# Patient Record
Sex: MALE | Race: WHITE | NOT HISPANIC OR LATINO | Employment: OTHER | URBAN - METROPOLITAN AREA
[De-identification: names, ages, dates, MRNs, and addresses within clinical notes are randomized per-mention and may not be internally consistent; named-entity substitution may affect disease eponyms.]

---

## 2019-06-21 ENCOUNTER — CLINICAL SUPPORT (OUTPATIENT)
Dept: URGENT CARE | Facility: CLINIC | Age: 54
End: 2019-06-21
Payer: MEDICAID

## 2019-06-21 VITALS
RESPIRATION RATE: 16 BRPM | HEIGHT: 70 IN | TEMPERATURE: 98 F | SYSTOLIC BLOOD PRESSURE: 133 MMHG | BODY MASS INDEX: 43.09 KG/M2 | HEART RATE: 85 BPM | DIASTOLIC BLOOD PRESSURE: 94 MMHG | OXYGEN SATURATION: 97 % | WEIGHT: 301 LBS

## 2019-06-21 DIAGNOSIS — S90.862A INSECT BITE OF LEFT FOOT WITH LOCAL REACTION, INITIAL ENCOUNTER: Primary | ICD-10-CM
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
## 2019-06-21 PROCEDURE — 99204 PR OFFICE/OUTPT VISIT, NEW, LEVL IV, 45-59 MIN: ICD-10-PCS | Mod: S$GLB,,, | Performed by: NURSE PRACTITIONER

## 2019-06-21 PROCEDURE — 99204 OFFICE O/P NEW MOD 45 MIN: CPT | Mod: S$GLB,,, | Performed by: NURSE PRACTITIONER

## 2019-06-21 RX ORDER — AMLODIPINE BESYLATE 10 MG/1
10 TABLET ORAL DAILY
COMMUNITY
End: 2019-06-27 | Stop reason: SDUPTHER

## 2019-06-21 RX ORDER — CETIRIZINE HYDROCHLORIDE 10 MG/1
10 TABLET ORAL DAILY
Qty: 30 TABLET | Refills: 0 | Status: SHIPPED | OUTPATIENT
Start: 2019-06-21 | End: 2020-09-01

## 2019-06-21 RX ORDER — PREDNISONE 20 MG/1
40 TABLET ORAL DAILY
Qty: 10 TABLET | Refills: 0 | Status: SHIPPED | OUTPATIENT
Start: 2019-06-21 | End: 2019-06-26

## 2019-06-21 RX ORDER — IBUPROFEN 800 MG/1
800 TABLET ORAL EVERY 8 HOURS PRN
Qty: 30 TABLET | Refills: 0 | Status: SHIPPED | OUTPATIENT
Start: 2019-06-21 | End: 2019-07-01

## 2019-06-21 NOTE — PATIENT INSTRUCTIONS
Insect Bite  Insects most often bite to protect themselves or their nests. Certain bugs, like fleas and mosquitoes, bite to feed. In some cases, the actual bite causes no pain. An itchy red welt or swelling may develop at the site of the bite. Most insect bites do not cause illness. And the itching and swelling most often go away without treatment. However, an infection can develop if the bite is scratched and the skin broken. Rarely, a person may have an allergic reaction to an insect bite.  If a stinger is visible at the bite spot, remove it as quickly as possible, as this can decrease the amount of venom that gets into your body. Scrape it out with a dull edge, such as the edge of a credit card. Try not to squeeze it. Do not try to dig it out, as you may damage the skin and also increase the chance of infection.     To help reduce swelling and itching, apply a cold pack or ice in a zip-top plastic bag wrapped in a thin towel.   Home care  · Your healthcare provider may prescribe over-the-counter medicines to help relieve itching and swelling. Use each medicine according to the directions on the package. If the bite becomes infected, you will need an antibiotic. This may be in pill form taken by mouth or as an ointment or cream put directly on the skin. Be sure to use them exactly as prescribed.  · Bite symptoms usually go away on their own within a week or two.  · To help prevent infection, avoid scratching or picking at the bite.  · To help relieve itching and swelling, apply ice in a zip-top plastic bag wrapped in a thin towel to the bites. Do this for up to 10 minutes at a time. Avoid hot showers or baths as these tend to make itching worse.  · An over-the-counter anti-itch medicine such as calamine lotion or an antihistamine cream may be helpful.  · If you suspect you have insects in your home, talk to a licensed pest-control professional. He or she can inspect your home and tell you how to get rid of bugs  safely.  Follow-up care  Follow up with your healthcare provider, or as advised.  Call 911  Call 911 if any of these occur:  · Trouble breathing or swallowing  · Wheezing  · Feeling like your throat is closing up  · Fainting, loss of consciousness  · Swelling around the face or mouth  When to seek medical advice  Call your healthcare provider right away if any of these occur:  · Fever of 100.4°F (38°C) or higher, or as directed by your healthcare provider  · Signs of infection, such as increased swelling and pain, warmth, red streaks, or drainage from the skin  · Signs of allergic reaction, such as hives, a spreading rash, or throat itching  Date Last Reviewed: 10/1/2016  © 1209-1172 [employer]. [address] [place] [address], [place] [address]. All rights reserved. This information is not intended as a substitute for professional medical care. Always follow your healthcare professional's instructions.        Insect Sting: Local Reaction   You have been stung or bitten by an insect. The insects venom or body fluid is causing your skin to react in the area where you were stung or bitten. This often causes redness, itching and swelling. This reaction will fade over a few hours, but it can last a few days. An insect bite or sting can become infected 1 to 3 days later, so watch for the signs below. Sometimes it is hard to tell the difference between a local reaction to the insect bite or sting and an early infection, so you may be given antibiotics.  Common insect stings causing problems are from wasps, bees, yellow jackets, and hornets. Common bites are from spiders, mosquitoes, fleas, or ticks. Other types of insects may be more common in different parts of the country or world.  Most people think of allergic reactions when someone has a rash or itchy skin. Symptoms can include:  · Rash, hives, redness, welts, or blisters  · Itching, burning, stinging, or pain  · Swelling around the sting area.  Sometimes  swelling spreads to other areas.  Home care  Medicines  The healthcare provider may prescribe medicines to relieve swelling, itching, and pain. Follow the providers instructions when taking these medicines.  · If you had a severe reaction, the provider may prescribe an epinephrine kit. Epinephrine will stop an allergic reaction from getting worse. Before you leave the hospital, be sure that you understand when and how to use this medicine.  · Diphenhydramine is an oral antihistamine available at drugstores and groceries. Unless a prescription antihistamine was given, you can use this medicine to reduce itching if large areas of the skin are involved. The medicine may make you sleepy, so be careful using it in the daytime or when going to school, working, or driving. Dont use diphenhydramine if you have glaucoma or if you are a man with trouble urinating because of an enlarged prostate. Other antihistamines cause less drowsiness and are good choices for daytime use. Ask your pharmacist for suggestions.  · Dont use diphenhydramine cream on your skin. In some people it can cause additional reaction and make you allergic to this medicine.  · Calamine lotion or oatmeal baths sometimes help with itching.  · You may use acetaminophen or ibuprofen to control pain, unless another pain medicine was prescribed. Talk with your healthcare provider before using these medicines if you have chronic liver or kidney disease. Also talk with your provider if youve had a stomach ulcer or GI bleeding.  General care    · If itching is a problem, dont take hot showers or baths. Stay out of direct sunlight. These heat up your skin and will make the itching worse.  · Use an ice pack to reduce local areas of redness and itching. You can make your own ice pack by putting ice cubes in a bag that seals and wrapping it in a thin towel. Dont put the ice directly on your skin, because it can damage the skin.  · Try not to scratch any affected  areas and damage the skin. This will help prevent an infection.  · If oral antibiotics were prescribed, be sure to take them until finished.  Preventing future reactions  · Future reactions could be worse than this one, so try to stay away from places where you might be stung again.  · Be aware that honeybees nest in trees. Wasps and yellow jackets nest in the ground, trees, or roof eaves.  · If you are stung by a honeybee, a stinger will remain in your skin. Wasps, yellow jackets, and hornets dont leave a stinger behind. Move away from the nest area right away. The stinger of a honeybee releases a substance that will attract other bees to you. Once you are away from the nest, then remove the stinger as quickly as possible.  · After any sting, you may apply ice and take diphenhydramine or another antihistamine. If you develop any of the warning signs below, seek help right away.  · If you are at high risk for another sting, or if your reaction included dizziness, fainting, or trouble breathing or swallowing, ask your doctor for an insect allergy kit.  · Remove any ticks on the skin with a set of fine tweezers.  the tick as close to the skin as possible. Pull back gently but firmly. Use an even, steady pressure. Dont jerk or twist. Dont squeeze, crush, or puncture the body of the tick. The bodily fluids may contain infection-causing germs. Dont use a smoldering match or cigarette, nail polish, petroleum jelly, liquid soap, or kerosene. These may irritate the tick. If any mouthparts of the tick remain in the skin, these should be left alone. They will fall off on their own. Trying to remove these parts may damage the skin unless they can be removed very easily. After the tick is removed, wash the bite area with rubbing alcohol, iodine, or soap and water.  Follow-up care  Follow up with your doctor in 2 days, or as advised, if your symptoms dont start to get better.  Call 911  Call 911 if any of these  occur:  · Trouble breathing or swallowing, or wheezing  · New or worsening swelling in the mouth, throat, or tongue  · Hoarse voice or trouble speaking  · Confused  · Very drowsy or trouble awakening  · Fainting or loss of consciousness  · Rapid heart rate  · Low blood pressure  · Feeling of doom  · Nausea, vomiting, abdominal pain, or diarrhea  · Vomiting blood, or large amounts of blood in stool  · Seizure  When to seek medical advice  Call your healthcare provider right away if any of these occur:  · Spreading areas of itching, redness or swelling  · New or worse swelling in the face, eyelids, or  lips  · Dizziness or weakness  Also call your provider right away if you have signs of infection:  · Spreading redness  · Increased pain or swelling  · Fever of 100.4°F (38°C) or higher, or as directed by your healthcare provider  · Colored fluid draining from the sting area   Date Last Reviewed: 10/1/2016  © 7689-4432 [employer]. [address] [place] [address], [place] [address]. All rights reserved. This information is not intended as a substitute for professional medical care. Always follow your healthcare professional's instructions.

## 2019-06-21 NOTE — PROGRESS NOTES
"Subjective:       Patient ID: Quinton Clayton is a 54 y.o. male.    Vitals:  height is 5' 10" (1.778 m) and weight is 136.5 kg (301 lb) (abnormal). His oral temperature is 97.6 °F (36.4 °C). His blood pressure is 133/94 (abnormal) and his pulse is 85. His respiration is 16 and oxygen saturation is 97%.     Chief Complaint: Insect Bite (X 1 week)    Patient reports pain and swelling to left foot s/p a sand fly biting him 5 days ago.     Insect Bite   This is a new problem. The current episode started in the past 7 days. The problem occurs constantly. The problem has been gradually worsening. Pertinent negatives include no abdominal pain, arthralgias, chest pain, chills, congestion, coughing, fatigue, fever, headaches, joint swelling, myalgias, nausea, rash, sore throat, vertigo or vomiting. Nothing aggravates the symptoms. He has tried nothing for the symptoms. The treatment provided no relief.       Constitution: Negative for chills, fatigue and fever.   HENT: Negative for congestion and sore throat.    Neck: Negative for painful lymph nodes.   Cardiovascular: Negative for chest pain and leg swelling.   Eyes: Negative for double vision and blurred vision.   Respiratory: Negative for cough and shortness of breath.    Gastrointestinal: Negative for abdominal pain, nausea, vomiting and diarrhea.   Genitourinary: Negative for dysuria, frequency and urgency.   Musculoskeletal: Negative for joint pain, joint swelling, muscle cramps and muscle ache.   Skin: Positive for wound. Negative for color change, pale and rash.   Allergic/Immunologic: Negative for seasonal allergies.   Neurological: Negative for dizziness, history of vertigo, light-headedness, passing out and headaches.   Hematologic/Lymphatic: Negative for swollen lymph nodes, easy bruising/bleeding and history of blood clots. Does not bruise/bleed easily.   Psychiatric/Behavioral: Negative for nervous/anxious, sleep disturbance and depression. The patient is not " nervous/anxious.        Objective:      Physical Exam   Constitutional: He is oriented to person, place, and time. Vital signs are normal. He appears well-developed and well-nourished.  Non-toxic appearance. He does not have a sickly appearance. He does not appear ill.   HENT:   Head: Normocephalic.   Right Ear: Tympanic membrane, external ear and ear canal normal.   Left Ear: Tympanic membrane, external ear and ear canal normal.   Nose: Nose normal.   Mouth/Throat: Uvula is midline and oropharynx is clear and moist.   Eyes: Pupils are equal, round, and reactive to light. Conjunctivae and EOM are normal.   Neck: Normal range of motion and full passive range of motion without pain. Neck supple.   Cardiovascular: Normal rate, regular rhythm and normal heart sounds.   Pulmonary/Chest: Effort normal and breath sounds normal.   Abdominal: Soft. Normal appearance and bowel sounds are normal. There is no tenderness.   Musculoskeletal: Normal range of motion.   Lymphadenopathy:     He has no cervical adenopathy.   Neurological: He is alert and oriented to person, place, and time. GCS eye subscore is 4. GCS verbal subscore is 5. GCS motor subscore is 6.   Skin: Skin is warm, dry and intact. No rash noted.   Insect bite to dorsum of left foot with localized edema, tender to touch. Not warm to touch, no induration, no fluctuance.    Psychiatric: He has a normal mood and affect.   Vitals reviewed.      Assessment:       1. Insect bite of left foot with local reaction, initial encounter        Plan:         Insect bite of left foot with local reaction, initial encounter    Other orders  -     cetirizine (ZYRTEC) 10 MG tablet; Take 1 tablet (10 mg total) by mouth once daily.  Dispense: 30 tablet; Refill: 0  -     predniSONE (DELTASONE) 20 MG tablet; Take 2 tablets (40 mg total) by mouth once daily. for 5 days  Dispense: 10 tablet; Refill: 0  -     ibuprofen (ADVIL,MOTRIN) 800 MG tablet; Take 1 tablet (800 mg total) by mouth every  8 (eight) hours as needed for Pain.  Dispense: 30 tablet; Refill: 0

## 2019-06-27 RX ORDER — AMLODIPINE BESYLATE 10 MG/1
10 TABLET ORAL DAILY
Qty: 30 TABLET | Refills: 0 | Status: SHIPPED | OUTPATIENT
Start: 2019-06-27 | End: 2020-09-01 | Stop reason: SDUPTHER

## 2020-09-01 ENCOUNTER — OFFICE VISIT (OUTPATIENT)
Dept: URGENT CARE | Facility: CLINIC | Age: 55
End: 2020-09-01
Payer: MEDICAID

## 2020-09-01 VITALS
RESPIRATION RATE: 16 BRPM | HEIGHT: 72 IN | TEMPERATURE: 98 F | DIASTOLIC BLOOD PRESSURE: 92 MMHG | BODY MASS INDEX: 40.12 KG/M2 | OXYGEN SATURATION: 96 % | SYSTOLIC BLOOD PRESSURE: 154 MMHG | WEIGHT: 296.19 LBS | HEART RATE: 71 BPM

## 2020-09-01 DIAGNOSIS — I10 HYPERTENSION, UNSPECIFIED TYPE: Primary | ICD-10-CM

## 2020-09-01 DIAGNOSIS — K21.9 GASTROESOPHAGEAL REFLUX DISEASE, ESOPHAGITIS PRESENCE NOT SPECIFIED: ICD-10-CM

## 2020-09-01 PROCEDURE — 99214 OFFICE O/P EST MOD 30 MIN: CPT | Mod: S$GLB,,, | Performed by: EMERGENCY MEDICINE

## 2020-09-01 PROCEDURE — 99214 PR OFFICE/OUTPT VISIT, EST, LEVL IV, 30-39 MIN: ICD-10-PCS | Mod: S$GLB,,, | Performed by: EMERGENCY MEDICINE

## 2020-09-01 RX ORDER — PANTOPRAZOLE SODIUM 40 MG/1
40 TABLET, DELAYED RELEASE ORAL DAILY
Qty: 30 TABLET | Refills: 1 | Status: SHIPPED | OUTPATIENT
Start: 2020-09-01 | End: 2021-02-02 | Stop reason: SDUPTHER

## 2020-09-01 RX ORDER — AMLODIPINE BESYLATE 10 MG/1
10 TABLET ORAL DAILY
Qty: 30 TABLET | Refills: 1 | Status: SHIPPED | OUTPATIENT
Start: 2020-09-01 | End: 2021-02-02 | Stop reason: SDUPTHER

## 2020-09-01 NOTE — PATIENT INSTRUCTIONS
Low-Salt Choices  Eating salt (sodium) can make your body retain too much water. Excess water makes your heart work harder. Canned, packaged, and frozen foods are easy to prepare, but they are often high in sodium. Here are some ideas for low-salt foods you can easily prepare yourself.    For breakfast  · Fruit or 100% fruit juice  · Whole-wheat bread or an English muffin. Compare sodium content on labels.  · Low-fat milk or yogurt  · Unsalted eggs  · Shredded wheat  · Corn tortillas  · Unsalted steamed rice  · Regular (not instant) hot cereal, made without salt  Stay away from:  · Sausage, perez, and ham  · Flour tortillas  · Packaged muffins, pancakes, and biscuits  · Instant hot cereals  · Cottage cheese  For lunch and dinner  · Fresh fish, chicken, turkey, or meat--baked, broiled, or roasted without salt  · Dry beans, cooked without salt  · Tofu, stir-fried without salt  · Unsalted fresh fruit and vegetables, or frozen or canned fruit and vegetables with no added salt  Stay away from:  · Lunch or deli meat that is cured or smoked  · Cheese  · Tomato juice and catsup  · Canned vegetables, soups, and fish not labeled as no-salt-added or reduced sodium  · Packaged gravies and sauces  · Olives, pickles, and relish  · Bottled salad dressings  For snacks and desserts  · Yogurt  · Unsalted, air popped popcorn  · Unsalted nuts or seeds  Stay away from:  · Pies and cakes  · Packaged dessert mixes  · Pizza  · Canned and packaged puddings  · Pretzels, chips, crackers, and nuts--unless the label says unsalted  Date Last Reviewed: 6/17/2015  © 5828-7144 [employer]. [address] [place] [address], [place] [address]. All rights reserved. This information is not intended as a substitute for professional medical care. Always follow your healthcare professional's instructions.        Low-Salt Diet  This diet removes foods that are high in salt. It also limits the amount of salt you use when cooking. It is most often used  for people with high blood pressure, edema (fluid retention), and kidney, liver, or heart disease.  Table salt contains the mineral sodium. Your body needs sodium to work normally. But too much sodium can make your health problems worse. Your healthcare provider is recommending a low-salt (also called low-sodium) diet for you. Your total daily allowance of salt is 1,500 to 2,300 milligrams (mg). It is less than 1 teaspoon of table salt. This means you can have only about 500 to 700 mg of sodium at each meal. People with certain health problems should limit salt intake to the lower end of the recommended range.    When you cook, dont add much salt. If you can cook without using salt, even better. Dont add salt to your food at the table.  When shopping, read food labels. Salt is often called sodium on the label. Choose foods that are salt-free, low salt, or very low salt. Note that foods with reduced salt may not lower your salt intake enough.    Beans, potatoes, and pasta  Ok: Dry beans, split peas, lentils, potatoes, rice, macaroni, pasta, spaghetti without added salt  Avoid: Potato chips, tortilla chips, and similar products  Breads and cereals  Ok: Low-sodium breads, rolls, cereals, and cakes; low-salt crackers, matzo crackers  Avoid: Salted crackers, pretzels, popcorn, Setswana toast, pancakes, muffins  Dairy  Ok: Milk, chocolate milk, hot chocolate mix, low-salt cheeses, and yogurt  Avoid: Processed cheese and cheese spreads; Roquefort, Camembert, and cottage cheese; buttermilk, instant breakfast drink  Desserts  Ok: Ice cream, frozen yogurt, juice bars, gelatin, cookies and pies, sugar, honey, jelly, hard candy  Avoid: Most pies, cakes and cookies prepared or processed with salt; instant pudding  Drinks  Ok: Tea, coffee, fizzy (carbonated) drinks, juices  Avoid: Flavored coffees, electrolyte replacement drinks, sports drinks  Meats  Ok: All fresh meat, fish, poultry, low-salt tuna, eggs, egg substitute  Avoid:  Smoked, pickled, brine-cured, or salted meats and fish. This includes perez, chipped beef, corned beef, hot dogs, deli meats, ham, kosher meats, salt pork, sausage, canned tuna, salted codfish, smoked salmon, herring, sardines, or anchovies.  Seasonings and spices  Ok: Most seasonings are okay. Good substitutes for salt include: fresh herb blends, hot sauce, lemon, garlic, pena, vinegar, dry mustard, parsley, cilantro, horseradish, tomato paste, regular margarine, mayonnaise, unsalted butter, cream cheese, vegetable oil, cream, low-salt salad dressing and gravy.  Avoid: Regular ketchup, relishes, pickles, soy sauce, teriyaki sauce, Worcestershire sauce, BBQ sauce, tartar sauce, meat tenderizer, chili sauce, regular gravy, regular salad dressing, salted butter  Soups  Ok: Low-salt soups and broths made with allowed foods  Avoid: Bouillon cubes, soups with smoked or salted meats, regular soup and broth  Vegetables  Ok: Most vegetables are okay; also low-salt tomato and vegetable juices  Avoid: Sauerkraut and other brine-soaked vegetables; pickles and other pickled vegetables; tomato juice, olives  Date Last Reviewed: 8/1/2016  © 4736-4801 [employer]. [address] [place] [address], [place] [address]. All rights reserved. This information is not intended as a substitute for professional medical care. Always follow your healthcare professional's instructions.

## 2020-09-01 NOTE — PROGRESS NOTES
Subjective:       Patient ID: Quinton Clayton is a 55 y.o. male.    Vitals:  height is 6' (1.829 m) and weight is 134.4 kg (296 lb 3.2 oz). His temperature is 97.8 °F (36.6 °C). His blood pressure is 154/92 (abnormal) and his pulse is 71. His respiration is 16 and oxygen saturation is 96%.     Chief Complaint: Hypertension    Patient presents today with concerns of elevated BP and medication refill request. Patient states that he has been staying in [place] taking care of a friend during the pandemic. He has not been bale to see his PCP who is in [facility]. He reports that he usually takes amlodipine 10 mg once daily but has been out of his meds for about a week. He denies cp/sob, Denies HA, dizziness and lightheadedness. No ankle edema. He reports that while on medication his BP range 120-130/ 85-90.         Constitution: Negative for activity change, appetite change, chills, fatigue and unexpected weight change.   HENT: Negative.    Cardiovascular: Negative for chest pain, leg swelling, palpitations and sob on exertion.   Eyes: Negative for photophobia, vision loss, double vision and blurred vision.   Respiratory: Negative for chest tightness, cough and shortness of breath.    Gastrointestinal: Positive for heartburn (not current issue but does happen occasioanlly). Negative for abdominal pain.   Neurological: Negative for dizziness, light-headedness and headaches.       Objective:      Physical Exam   Constitutional: He is oriented to person, place, and time. He appears well-developed. He is cooperative.  Non-toxic appearance. He does not appear ill. No distress.   HENT:   Head: Normocephalic and atraumatic.   Ears:   Right Ear: Hearing, tympanic membrane, external ear and ear canal normal.   Left Ear: Hearing, tympanic membrane, external ear and ear canal normal.   Nose: Nose normal. No mucosal edema, rhinorrhea or nasal deformity. No epistaxis. Right sinus exhibits no maxillary sinus tenderness and no frontal  sinus tenderness. Left sinus exhibits no maxillary sinus tenderness and no frontal sinus tenderness.   Mouth/Throat: Uvula is midline, oropharynx is clear and moist and mucous membranes are normal. No trismus in the jaw. Normal dentition. No uvula swelling. No oropharyngeal exudate, posterior oropharyngeal edema or posterior oropharyngeal erythema.   Eyes: Conjunctivae and lids are normal. No scleral icterus.   Neck: Trachea normal, full passive range of motion without pain and phonation normal. Neck supple. No neck rigidity. No edema and no erythema present.   Cardiovascular: Normal rate, regular rhythm and normal heart sounds.   Pulmonary/Chest: Effort normal and breath sounds normal. No respiratory distress. He has no decreased breath sounds. He has no rhonchi.   Abdominal: Normal appearance.   Musculoskeletal: Normal range of motion.         General: No deformity.   Neurological: He is alert and oriented to person, place, and time. He exhibits normal muscle tone. Coordination normal.   Skin: Skin is warm, dry, intact, not diaphoretic and not pale. Psychiatric: His speech is normal and behavior is normal. Mood, judgment and thought content normal.   Nursing note and vitals reviewed.        Assessment:       1. Hypertension, unspecified type    2. Gastroesophageal reflux disease, esophagitis presence not specified        Plan:       Discussed lifestyle changes and importance of primary care for appropriate follow up.  Low salt diet  Increase water  Graded exercise    Hypertension, unspecified type  -     amLODIPine (NORVASC) 10 MG tablet; Take 1 tablet (10 mg total) by mouth once daily.  Dispense: 30 tablet; Refill: 1    Gastroesophageal reflux disease, esophagitis presence not specified  -     pantoprazole (PROTONIX) 40 MG tablet; Take 1 tablet (40 mg total) by mouth once daily.  Dispense: 30 tablet; Refill: 1

## 2020-09-03 ENCOUNTER — TELEPHONE (OUTPATIENT)
Dept: URGENT CARE | Facility: CLINIC | Age: 55
End: 2020-09-03

## 2020-10-26 ENCOUNTER — OFFICE VISIT (OUTPATIENT)
Dept: URGENT CARE | Facility: CLINIC | Age: 55
End: 2020-10-26
Payer: MEDICAID

## 2020-10-26 VITALS
WEIGHT: 303 LBS | DIASTOLIC BLOOD PRESSURE: 80 MMHG | BODY MASS INDEX: 41.04 KG/M2 | RESPIRATION RATE: 16 BRPM | OXYGEN SATURATION: 95 % | HEART RATE: 80 BPM | TEMPERATURE: 98 F | SYSTOLIC BLOOD PRESSURE: 147 MMHG | HEIGHT: 72 IN

## 2020-10-26 DIAGNOSIS — L72.9 CYST OF SKIN: Primary | ICD-10-CM

## 2020-10-26 PROCEDURE — 99214 PR OFFICE/OUTPT VISIT, EST, LEVL IV, 30-39 MIN: ICD-10-PCS | Mod: S$GLB,,, | Performed by: NURSE PRACTITIONER

## 2020-10-26 PROCEDURE — 99214 OFFICE O/P EST MOD 30 MIN: CPT | Mod: S$GLB,,, | Performed by: NURSE PRACTITIONER

## 2020-10-26 NOTE — PROGRESS NOTES
Subjective:       Patient ID: Quinton Clayton is a 55 y.o. male.    Vitals:  height is 6' (1.829 m) and weight is 137.4 kg (303 lb) (abnormal). His temperature is 98 °F (36.7 °C). His blood pressure is 147/80 (abnormal) and his pulse is 80. His respiration is 16 and oxygen saturation is 95%.     Chief Complaint: Recurrent Skin Infections    9-10 days. States theres an infection behind his neck. States it is not painful. Says he opened it up about a week ago, but only blood came out. Put neopsorin on it.       Constitution: Negative for chills, fatigue and fever.   HENT: Negative for congestion and sore throat.    Neck: Negative for painful lymph nodes.   Cardiovascular: Negative for chest pain and leg swelling.   Eyes: Negative for double vision and blurred vision.   Respiratory: Negative for cough and shortness of breath.    Gastrointestinal: Negative for nausea, vomiting and diarrhea.   Genitourinary: Negative for dysuria, frequency and urgency.   Musculoskeletal: Negative for joint pain, joint swelling, muscle cramps and muscle ache.   Skin: Positive for abscess. Negative for color change, pale and rash.   Allergic/Immunologic: Negative for seasonal allergies.   Neurological: Negative for dizziness, history of vertigo, light-headedness, passing out and headaches.   Hematologic/Lymphatic: Negative for swollen lymph nodes, easy bruising/bleeding and history of blood clots. Does not bruise/bleed easily.   Psychiatric/Behavioral: Negative for nervous/anxious, sleep disturbance and depression. The patient is not nervous/anxious.        Objective:      Physical Exam   Constitutional: He is oriented to person, place, and time. He appears well-developed. He is cooperative.  Non-toxic appearance. He does not appear ill. No distress.   HENT:   Head: Normocephalic and atraumatic.   Ears:   Right Ear: Hearing, tympanic membrane, external ear and ear canal normal.   Left Ear: Hearing, tympanic membrane, external ear and ear  canal normal.   Nose: Nose normal. No mucosal edema, rhinorrhea or nasal deformity. No epistaxis. Right sinus exhibits no maxillary sinus tenderness and no frontal sinus tenderness. Left sinus exhibits no maxillary sinus tenderness and no frontal sinus tenderness.   Mouth/Throat: Uvula is midline, oropharynx is clear and moist and mucous membranes are normal. No trismus in the jaw. Normal dentition. No uvula swelling. No posterior oropharyngeal erythema.   Eyes: Conjunctivae and lids are normal. Right eye exhibits no discharge. Left eye exhibits no discharge. No scleral icterus.   Neck: Trachea normal, normal range of motion, full passive range of motion without pain and phonation normal. Neck supple.   Cardiovascular: Normal rate, regular rhythm, normal heart sounds and normal pulses.   Pulmonary/Chest: Effort normal and breath sounds normal. No respiratory distress.   Abdominal: Soft. Normal appearance and bowel sounds are normal. He exhibits no distension, no pulsatile midline mass and no mass. There is no abdominal tenderness.   Musculoskeletal: Normal range of motion.         General: No deformity.   Neurological: He is alert and oriented to person, place, and time. He exhibits normal muscle tone. Coordination normal. GCS eye subscore is 4. GCS verbal subscore is 5. GCS motor subscore is 6.   Skin: Skin is warm, dry, intact, not diaphoretic and not pale.      Psychiatric: His speech is normal and behavior is normal. Judgment and thought content normal.   Nursing note and vitals reviewed.        Assessment:       1. Cyst of skin        Plan:       Referred to derm for removal of cyst.     Cyst of skin  -     Ambulatory referral/consult to Dermatology

## 2020-10-26 NOTE — PATIENT INSTRUCTIONS
Epidermoid Cyst (Sebaceous Cyst), No Infection  An epidermoid cyst (sebaceous cyst) is a term that refers to 2 similar types of cysts: those found in the skin (epidermoid), and those found around hair follicles (pilar).  Some general facts about these cysts:  · A cyst is a sac filled with material that is often cheesy, fatty, oily, or fibrous. The material in them can be thick (like cottage cheese) or liquid.  · They form slowly under the skin, and can be found on most parts of the body. They are most often found in hairier areas like the scalp, face, upper back, and genitals.  · You can usually move them slightly if you try.  · They can be smaller than a pea or as large as a few inches.  · They are usually not painful, unless they become inflamed or infected.  · The area around the cyst may smell bad. If the cyst breaks open, the material inside it often smells bad too.  Causes  Epidermoid cysts are caused when skin (epidermal) cells move under the skin surface, or are covered over by it. These cells continue to multiply, like skin does normally. They then form a wall around themselves (cyst) and secrete normal skin fluids (keratin). This may be developmental. But it often happens because of an injury to the skin.  Epidermoid cysts are often found around hair follicles. These follicles are like cysts, but they have openings. Normal lubricating oils for your hair are sent out through these openings. A cyst occurs when an opening becomes blocked or the site inflamed. This often occurs when there is damage to the hair follicles by a scrape or wound.    Pilar cysts are similar to epidermoid cysts. But they start from a different part of the hair follicle, and are more likely to be on the scalp.  Symptoms  · Feeling a lump just beneath the skin  · It may or may not be painful  · The cyst may or may not smell bad  · The cyst may become inflamed or red  · The cyst may leak fluid or thick material  Home care  Epidermoid  cysts often go away without any treatment. If your cyst doesnt go away, and it bothers you, it may be drained or removed. If the cyst drains on its own, it may return. Resist the temptation to squeeze, pop, stick a needle in it, or cut it open. This often leads to an infection and scarring. If it gets severely inflamed or infected, you should seek medical care. Be sure to clean the cyst area when bathing or showering. Watch for the signs of infection listed below.  Follow-up care  Follow up with your healthcare provider, or as advised.  When to seek medical advice  Call your healthcare provider right away if any of these occur:  · Swelling, redness, or pain  · Pus coming from the cyst  Date Last Reviewed: 8/1/2016  © 8037-6757 [employer]. [address] [place] [address], [place] [address]. All rights reserved. This information is not intended as a substitute for professional medical care. Always follow your healthcare professional's instructions.

## 2020-11-13 ENCOUNTER — TELEPHONE (OUTPATIENT)
Dept: DERMATOLOGY | Facility: CLINIC | Age: 55
End: 2020-11-13

## 2020-11-13 NOTE — TELEPHONE ENCOUNTER
Spoke with pt. Pt asked if we accept medicaid? Informed pt we're not accepting any new medicaid pt's at this time. Gave pt alternative derm information. Pt verbalized understanding and thanked me.    RD        ----- Message from Kim Ortiz sent at 11/13/2020 11:19 AM CST -----  Regarding: Appointment  Type:   Appointment Request      Name of Caller: Patient referred by Dr Ann Wolfe to Dermatology for  Cyst of skin  When is the first available appointment?  Symptoms:  Best Call Back Number:[phone]  Additional Information:

## 2021-02-02 ENCOUNTER — OFFICE VISIT (OUTPATIENT)
Dept: URGENT CARE | Facility: CLINIC | Age: 56
End: 2021-02-02
Payer: MEDICAID

## 2021-02-02 VITALS
DIASTOLIC BLOOD PRESSURE: 93 MMHG | SYSTOLIC BLOOD PRESSURE: 159 MMHG | HEIGHT: 72 IN | WEIGHT: 301 LBS | RESPIRATION RATE: 18 BRPM | OXYGEN SATURATION: 96 % | HEART RATE: 73 BPM | TEMPERATURE: 98 F | BODY MASS INDEX: 40.77 KG/M2

## 2021-02-02 DIAGNOSIS — K21.9 GASTROESOPHAGEAL REFLUX DISEASE WITHOUT ESOPHAGITIS: ICD-10-CM

## 2021-02-02 DIAGNOSIS — Z76.0 MEDICATION REFILL: ICD-10-CM

## 2021-02-02 DIAGNOSIS — I10 HYPERTENSION, UNSPECIFIED TYPE: ICD-10-CM

## 2021-02-02 DIAGNOSIS — J01.90 ACUTE NON-RECURRENT SINUSITIS, UNSPECIFIED LOCATION: Primary | ICD-10-CM

## 2021-02-02 PROCEDURE — 99214 PR OFFICE/OUTPT VISIT, EST, LEVL IV, 30-39 MIN: ICD-10-PCS | Mod: S$GLB,,, | Performed by: NURSE PRACTITIONER

## 2021-02-02 PROCEDURE — 99214 OFFICE O/P EST MOD 30 MIN: CPT | Mod: S$GLB,,, | Performed by: NURSE PRACTITIONER

## 2021-02-02 RX ORDER — CETIRIZINE HYDROCHLORIDE 10 MG/1
10 TABLET ORAL DAILY
Qty: 30 TABLET | Refills: 2 | Status: SHIPPED | OUTPATIENT
Start: 2021-02-02 | End: 2023-09-15

## 2021-02-02 RX ORDER — AMLODIPINE BESYLATE 10 MG/1
10 TABLET ORAL DAILY
Qty: 30 TABLET | Refills: 1 | Status: SHIPPED | OUTPATIENT
Start: 2021-02-02 | End: 2023-01-18 | Stop reason: SDUPTHER

## 2021-02-02 RX ORDER — PANTOPRAZOLE SODIUM 40 MG/1
40 TABLET, DELAYED RELEASE ORAL DAILY
Qty: 30 TABLET | Refills: 1 | Status: SHIPPED | OUTPATIENT
Start: 2021-02-02 | End: 2022-10-19

## 2021-02-02 RX ORDER — FLUTICASONE PROPIONATE 50 MCG
1 SPRAY, SUSPENSION (ML) NASAL 2 TIMES DAILY
Qty: 15.8 ML | Refills: 0 | Status: SHIPPED | OUTPATIENT
Start: 2021-02-02 | End: 2022-03-30 | Stop reason: SDUPTHER

## 2021-02-02 RX ORDER — DEXAMETHASONE SODIUM PHOSPHATE 4 MG/ML
8 INJECTION, SOLUTION INTRA-ARTICULAR; INTRALESIONAL; INTRAMUSCULAR; INTRAVENOUS; SOFT TISSUE
Status: DISCONTINUED | OUTPATIENT
Start: 2021-02-02 | End: 2023-07-08

## 2021-05-04 ENCOUNTER — OFFICE VISIT (OUTPATIENT)
Dept: URGENT CARE | Facility: CLINIC | Age: 56
End: 2021-05-04
Payer: MEDICAID

## 2021-05-04 VITALS
OXYGEN SATURATION: 98 % | SYSTOLIC BLOOD PRESSURE: 148 MMHG | RESPIRATION RATE: 16 BRPM | HEART RATE: 76 BPM | BODY MASS INDEX: 39.01 KG/M2 | HEIGHT: 72 IN | WEIGHT: 288 LBS | TEMPERATURE: 99 F | DIASTOLIC BLOOD PRESSURE: 85 MMHG

## 2021-05-04 DIAGNOSIS — J32.9 SINUSITIS, UNSPECIFIED CHRONICITY, UNSPECIFIED LOCATION: Primary | ICD-10-CM

## 2021-05-04 DIAGNOSIS — I10 HYPERTENSION, UNSPECIFIED TYPE: ICD-10-CM

## 2021-05-04 LAB
CTP QC/QA: YES
SARS-COV-2 RDRP RESP QL NAA+PROBE: NEGATIVE

## 2021-05-04 PROCEDURE — 99214 OFFICE O/P EST MOD 30 MIN: CPT | Mod: S$GLB,,, | Performed by: NURSE PRACTITIONER

## 2021-05-04 PROCEDURE — 99214 PR OFFICE/OUTPT VISIT, EST, LEVL IV, 30-39 MIN: ICD-10-PCS | Mod: S$GLB,,, | Performed by: NURSE PRACTITIONER

## 2021-05-04 PROCEDURE — 87635 SARS-COV-2 COVID-19 AMP PRB: CPT | Mod: QW,S$GLB,, | Performed by: NURSE PRACTITIONER

## 2021-05-04 PROCEDURE — 87635 PR SARS-COV-2 COVID-19 AMPLIFIED PROBE: ICD-10-PCS | Mod: QW,S$GLB,, | Performed by: NURSE PRACTITIONER

## 2021-05-04 RX ORDER — PROMETHAZINE HYDROCHLORIDE AND DEXTROMETHORPHAN HYDROBROMIDE 6.25; 15 MG/5ML; MG/5ML
5 SYRUP ORAL
Qty: 120 ML | Refills: 0 | Status: SHIPPED | OUTPATIENT
Start: 2021-05-04 | End: 2021-05-14

## 2021-05-04 RX ORDER — PANTOPRAZOLE SODIUM 40 MG/1
40 TABLET, DELAYED RELEASE ORAL DAILY
Qty: 14 TABLET | Refills: 0 | Status: SHIPPED | OUTPATIENT
Start: 2021-05-04 | End: 2022-05-04

## 2021-05-04 RX ORDER — BENZONATATE 200 MG/1
200 CAPSULE ORAL 3 TIMES DAILY PRN
Qty: 20 CAPSULE | Refills: 0 | Status: SHIPPED | OUTPATIENT
Start: 2021-05-04 | End: 2022-10-19 | Stop reason: SDUPTHER

## 2021-05-04 RX ORDER — MUPIROCIN 20 MG/G
OINTMENT TOPICAL DAILY
Qty: 22 G | Refills: 1 | Status: SHIPPED | OUTPATIENT
Start: 2021-05-04 | End: 2021-05-11

## 2021-05-04 RX ORDER — AMLODIPINE BESYLATE 10 MG/1
10 TABLET ORAL DAILY
Qty: 14 TABLET | Refills: 0 | Status: SHIPPED | OUTPATIENT
Start: 2021-05-04 | End: 2022-05-04

## 2021-05-04 RX ORDER — METHYLPREDNISOLONE 4 MG/1
TABLET ORAL
Qty: 1 PACKAGE | Refills: 0 | Status: SHIPPED | OUTPATIENT
Start: 2021-05-04 | End: 2021-05-25

## 2021-07-15 ENCOUNTER — OFFICE VISIT (OUTPATIENT)
Dept: URGENT CARE | Facility: CLINIC | Age: 56
End: 2021-07-15
Payer: MEDICAID

## 2021-07-15 VITALS
HEART RATE: 71 BPM | HEIGHT: 72 IN | DIASTOLIC BLOOD PRESSURE: 100 MMHG | OXYGEN SATURATION: 96 % | SYSTOLIC BLOOD PRESSURE: 156 MMHG | WEIGHT: 288.19 LBS | TEMPERATURE: 98 F | BODY MASS INDEX: 39.03 KG/M2

## 2021-07-15 DIAGNOSIS — M54.6 ACUTE LEFT-SIDED THORACIC BACK PAIN: Primary | ICD-10-CM

## 2021-07-15 DIAGNOSIS — N52.9 ERECTILE DYSFUNCTION, UNSPECIFIED ERECTILE DYSFUNCTION TYPE: ICD-10-CM

## 2021-07-15 DIAGNOSIS — I10 ESSENTIAL HYPERTENSION: ICD-10-CM

## 2021-07-15 DIAGNOSIS — Z76.0 MEDICATION REFILL: ICD-10-CM

## 2021-07-15 LAB
BILIRUB UR QL STRIP: NEGATIVE
GLUCOSE UR QL STRIP: NEGATIVE
KETONES UR QL STRIP: NEGATIVE
LEUKOCYTE ESTERASE UR QL STRIP: NEGATIVE
PH, POC UA: 5.5
POC BLOOD, URINE: NEGATIVE
POC NITRATES, URINE: NEGATIVE
PROT UR QL STRIP: NEGATIVE
SP GR UR STRIP: 1.01 (ref 1–1.03)
UROBILINOGEN UR STRIP-ACNC: NORMAL (ref 0.3–2.2)

## 2021-07-15 PROCEDURE — 81003 URINALYSIS AUTO W/O SCOPE: CPT | Mod: QW,S$GLB,, | Performed by: NURSE PRACTITIONER

## 2021-07-15 PROCEDURE — 81003 POCT URINALYSIS, DIPSTICK, AUTOMATED, W/O SCOPE: ICD-10-PCS | Mod: QW,S$GLB,, | Performed by: NURSE PRACTITIONER

## 2021-07-15 PROCEDURE — 99214 OFFICE O/P EST MOD 30 MIN: CPT | Mod: S$GLB,,, | Performed by: NURSE PRACTITIONER

## 2021-07-15 PROCEDURE — 99214 PR OFFICE/OUTPT VISIT, EST, LEVL IV, 30-39 MIN: ICD-10-PCS | Mod: S$GLB,,, | Performed by: NURSE PRACTITIONER

## 2021-07-15 RX ORDER — DICLOFENAC SODIUM 50 MG/1
50 TABLET, DELAYED RELEASE ORAL 3 TIMES DAILY PRN
Qty: 30 TABLET | Refills: 0 | Status: SHIPPED | OUTPATIENT
Start: 2021-07-15 | End: 2022-10-19

## 2021-07-15 RX ORDER — AMLODIPINE BESYLATE 10 MG/1
10 TABLET ORAL DAILY
Qty: 30 TABLET | Refills: 2 | Status: SHIPPED | OUTPATIENT
Start: 2021-07-15 | End: 2022-07-15

## 2021-07-15 RX ORDER — TIZANIDINE 4 MG/1
4 TABLET ORAL EVERY 6 HOURS PRN
Qty: 30 TABLET | Refills: 0 | Status: SHIPPED | OUTPATIENT
Start: 2021-07-15 | End: 2021-07-25

## 2021-07-15 RX ORDER — SILDENAFIL 25 MG/1
25 TABLET, FILM COATED ORAL DAILY PRN
Qty: 30 TABLET | Refills: 0 | Status: SHIPPED | OUTPATIENT
Start: 2021-07-15 | End: 2022-07-15

## 2021-07-15 RX ORDER — PANTOPRAZOLE SODIUM 40 MG/1
40 TABLET, DELAYED RELEASE ORAL DAILY
Qty: 30 TABLET | Refills: 2 | Status: SHIPPED | OUTPATIENT
Start: 2021-07-15 | End: 2022-07-15

## 2021-08-23 ENCOUNTER — OFFICE VISIT (OUTPATIENT)
Dept: URGENT CARE | Facility: CLINIC | Age: 56
End: 2021-08-23
Payer: MEDICAID

## 2021-08-23 VITALS
BODY MASS INDEX: 38.36 KG/M2 | DIASTOLIC BLOOD PRESSURE: 91 MMHG | WEIGHT: 283.19 LBS | HEART RATE: 75 BPM | HEIGHT: 72 IN | RESPIRATION RATE: 12 BRPM | OXYGEN SATURATION: 97 % | TEMPERATURE: 98 F | SYSTOLIC BLOOD PRESSURE: 144 MMHG

## 2021-08-23 DIAGNOSIS — R05.9 COUGH: ICD-10-CM

## 2021-08-23 DIAGNOSIS — U07.1 COVID-19 VIRUS DETECTED: Primary | ICD-10-CM

## 2021-08-23 LAB
CTP QC/QA: YES
SARS-COV-2 RDRP RESP QL NAA+PROBE: POSITIVE

## 2021-08-23 PROCEDURE — 87635 SARS-COV-2 COVID-19 AMP PRB: CPT | Mod: QW,S$GLB,, | Performed by: NURSE PRACTITIONER

## 2021-08-23 PROCEDURE — 99213 PR OFFICE/OUTPT VISIT, EST, LEVL III, 20-29 MIN: ICD-10-PCS | Mod: S$GLB,,, | Performed by: NURSE PRACTITIONER

## 2021-08-23 PROCEDURE — 87635 PR SARS-COV-2 COVID-19 AMPLIFIED PROBE: ICD-10-PCS | Mod: QW,S$GLB,, | Performed by: NURSE PRACTITIONER

## 2021-08-23 PROCEDURE — 99213 OFFICE O/P EST LOW 20 MIN: CPT | Mod: S$GLB,,, | Performed by: NURSE PRACTITIONER

## 2021-08-23 RX ORDER — FLUTICASONE PROPIONATE 50 MCG
1 SPRAY, SUSPENSION (ML) NASAL DAILY
Qty: 11.1 ML | Refills: 0 | Status: SHIPPED | OUTPATIENT
Start: 2021-08-23 | End: 2022-03-30 | Stop reason: SDUPTHER

## 2021-08-23 RX ORDER — PROMETHAZINE HYDROCHLORIDE AND DEXTROMETHORPHAN HYDROBROMIDE 6.25; 15 MG/5ML; MG/5ML
5 SYRUP ORAL NIGHTLY PRN
Qty: 118 ML | Refills: 0 | Status: SHIPPED | OUTPATIENT
Start: 2021-08-23 | End: 2021-09-07 | Stop reason: SDUPTHER

## 2021-08-23 RX ORDER — CETIRIZINE HYDROCHLORIDE 10 MG/1
10 TABLET ORAL DAILY
Qty: 30 TABLET | Refills: 0 | Status: SHIPPED | OUTPATIENT
Start: 2021-08-23 | End: 2021-09-22

## 2021-08-25 ENCOUNTER — INFUSION (OUTPATIENT)
Dept: INFECTIOUS DISEASES | Facility: HOSPITAL | Age: 56
End: 2021-08-25
Attending: NURSE PRACTITIONER
Payer: MEDICAID

## 2021-08-25 VITALS
OXYGEN SATURATION: 97 % | RESPIRATION RATE: 18 BRPM | TEMPERATURE: 97 F | SYSTOLIC BLOOD PRESSURE: 152 MMHG | DIASTOLIC BLOOD PRESSURE: 81 MMHG | HEART RATE: 67 BPM

## 2021-08-25 DIAGNOSIS — U07.1 COVID-19: Primary | ICD-10-CM

## 2021-08-25 PROCEDURE — M0243 CASIRIVI AND IMDEVI INFUSION: HCPCS | Performed by: NURSE PRACTITIONER

## 2021-08-25 PROCEDURE — 25000003 PHARM REV CODE 250: Performed by: NURSE PRACTITIONER

## 2021-08-25 PROCEDURE — 63600175 PHARM REV CODE 636 W HCPCS: Performed by: NURSE PRACTITIONER

## 2021-08-25 RX ORDER — ALBUTEROL SULFATE 90 UG/1
2 AEROSOL, METERED RESPIRATORY (INHALATION)
Status: DISPENSED | OUTPATIENT
Start: 2021-08-25

## 2021-08-25 RX ORDER — ACETAMINOPHEN 325 MG/1
650 TABLET ORAL ONCE AS NEEDED
Status: DISPENSED | OUTPATIENT
Start: 2021-08-25 | End: 2033-01-21

## 2021-08-25 RX ORDER — ONDANSETRON 4 MG/1
4 TABLET, ORALLY DISINTEGRATING ORAL ONCE AS NEEDED
Status: DISCONTINUED | OUTPATIENT
Start: 2021-08-25 | End: 2022-10-19

## 2021-08-25 RX ORDER — EPINEPHRINE 0.3 MG/.3ML
0.3 INJECTION SUBCUTANEOUS
Status: DISPENSED | OUTPATIENT
Start: 2021-08-25

## 2021-08-25 RX ORDER — SODIUM CHLORIDE 0.9 % (FLUSH) 0.9 %
10 SYRINGE (ML) INJECTION
Status: ACTIVE | OUTPATIENT
Start: 2021-08-25

## 2021-08-25 RX ORDER — DIPHENHYDRAMINE HYDROCHLORIDE 50 MG/ML
25 INJECTION INTRAMUSCULAR; INTRAVENOUS ONCE AS NEEDED
Status: DISCONTINUED | OUTPATIENT
Start: 2021-08-25 | End: 2022-10-19

## 2021-08-25 RX ADMIN — CASIRIVIMAB AND IMDEVIMAB 600 MG: 600; 600 INJECTION, SOLUTION, CONCENTRATE INTRAVENOUS at 12:08

## 2021-08-25 RX ADMIN — SODIUM CHLORIDE: 0.9 INJECTION, SOLUTION INTRAVENOUS at 12:08

## 2021-09-07 ENCOUNTER — OFFICE VISIT (OUTPATIENT)
Dept: URGENT CARE | Facility: CLINIC | Age: 56
End: 2021-09-07
Payer: MEDICAID

## 2021-09-07 VITALS
HEART RATE: 77 BPM | BODY MASS INDEX: 38.06 KG/M2 | DIASTOLIC BLOOD PRESSURE: 96 MMHG | OXYGEN SATURATION: 95 % | HEIGHT: 72 IN | SYSTOLIC BLOOD PRESSURE: 160 MMHG | TEMPERATURE: 99 F | WEIGHT: 281 LBS

## 2021-09-07 DIAGNOSIS — R09.82 POSTNASAL DRIP: ICD-10-CM

## 2021-09-07 DIAGNOSIS — J01.90 ACUTE BACTERIAL RHINOSINUSITIS: Primary | ICD-10-CM

## 2021-09-07 DIAGNOSIS — R05.9 COUGH: ICD-10-CM

## 2021-09-07 DIAGNOSIS — B96.89 ACUTE BACTERIAL RHINOSINUSITIS: Primary | ICD-10-CM

## 2021-09-07 PROCEDURE — 99213 OFFICE O/P EST LOW 20 MIN: CPT | Mod: S$GLB,,, | Performed by: NURSE PRACTITIONER

## 2021-09-07 PROCEDURE — 99213 PR OFFICE/OUTPT VISIT, EST, LEVL III, 20-29 MIN: ICD-10-PCS | Mod: S$GLB,,, | Performed by: NURSE PRACTITIONER

## 2021-09-07 RX ORDER — FLUTICASONE PROPIONATE 50 MCG
1 SPRAY, SUSPENSION (ML) NASAL DAILY
Qty: 15.8 ML | Refills: 0 | Status: SHIPPED | OUTPATIENT
Start: 2021-09-07 | End: 2022-03-30 | Stop reason: SDUPTHER

## 2021-09-07 RX ORDER — DOXYCYCLINE 100 MG/1
100 CAPSULE ORAL EVERY 12 HOURS
Qty: 14 CAPSULE | Refills: 0 | Status: SHIPPED | OUTPATIENT
Start: 2021-09-07 | End: 2021-09-14

## 2021-09-07 RX ORDER — PROMETHAZINE HYDROCHLORIDE AND DEXTROMETHORPHAN HYDROBROMIDE 6.25; 15 MG/5ML; MG/5ML
5 SYRUP ORAL NIGHTLY PRN
Qty: 118 ML | Refills: 0 | Status: SHIPPED | OUTPATIENT
Start: 2021-09-07 | End: 2022-10-19

## 2021-09-07 RX ORDER — PROMETHAZINE HYDROCHLORIDE AND DEXTROMETHORPHAN HYDROBROMIDE 6.25; 15 MG/5ML; MG/5ML
5 SYRUP ORAL NIGHTLY PRN
Qty: 118 ML | Refills: 0 | Status: SHIPPED | OUTPATIENT
Start: 2021-09-07 | End: 2021-09-07

## 2021-09-07 RX ORDER — MUPIROCIN 20 MG/G
OINTMENT TOPICAL 3 TIMES DAILY
Qty: 2 G | Refills: 0 | Status: SHIPPED | OUTPATIENT
Start: 2021-09-07 | End: 2023-12-16

## 2021-09-07 RX ORDER — BENZONATATE 200 MG/1
200 CAPSULE ORAL EVERY 8 HOURS PRN
Qty: 21 CAPSULE | Refills: 0 | Status: SHIPPED | OUTPATIENT
Start: 2021-09-07 | End: 2022-10-19 | Stop reason: SDUPTHER

## 2021-11-28 ENCOUNTER — OFFICE VISIT (OUTPATIENT)
Dept: URGENT CARE | Facility: CLINIC | Age: 56
End: 2021-11-28
Payer: MEDICAID

## 2021-11-28 VITALS
BODY MASS INDEX: 38.06 KG/M2 | HEIGHT: 72 IN | RESPIRATION RATE: 18 BRPM | TEMPERATURE: 98 F | SYSTOLIC BLOOD PRESSURE: 163 MMHG | WEIGHT: 281 LBS | DIASTOLIC BLOOD PRESSURE: 92 MMHG | HEART RATE: 82 BPM | OXYGEN SATURATION: 97 %

## 2021-11-28 DIAGNOSIS — Z86.79 HISTORY OF HYPERTENSION: ICD-10-CM

## 2021-11-28 DIAGNOSIS — Z76.0 ENCOUNTER FOR MEDICATION REFILL: ICD-10-CM

## 2021-11-28 DIAGNOSIS — S92.255A CLOSED NONDISPLACED FRACTURE OF NAVICULAR BONE OF LEFT FOOT, INITIAL ENCOUNTER: ICD-10-CM

## 2021-11-28 DIAGNOSIS — Z87.19 HISTORY OF GASTROESOPHAGEAL REFLUX (GERD): ICD-10-CM

## 2021-11-28 DIAGNOSIS — M79.672 LEFT FOOT PAIN: Primary | ICD-10-CM

## 2021-11-28 PROCEDURE — 99214 PR OFFICE/OUTPT VISIT, EST, LEVL IV, 30-39 MIN: ICD-10-PCS | Mod: 25,S$GLB,, | Performed by: NURSE PRACTITIONER

## 2021-11-28 PROCEDURE — 29515 PR APPLY LOWER LEG SPLINT: ICD-10-PCS | Mod: LT,S$GLB,, | Performed by: NURSE PRACTITIONER

## 2021-11-28 PROCEDURE — 29515 APPLICATION SHORT LEG SPLINT: CPT | Mod: LT,S$GLB,, | Performed by: NURSE PRACTITIONER

## 2021-11-28 PROCEDURE — 73630 X-RAY EXAM OF FOOT: CPT | Mod: LT,S$GLB,, | Performed by: RADIOLOGY

## 2021-11-28 PROCEDURE — 99214 OFFICE O/P EST MOD 30 MIN: CPT | Mod: 25,S$GLB,, | Performed by: NURSE PRACTITIONER

## 2021-11-28 PROCEDURE — 73630 XR FOOT COMPLETE 3 VIEW LEFT: ICD-10-PCS | Mod: LT,S$GLB,, | Performed by: RADIOLOGY

## 2021-11-28 RX ORDER — HYDROCODONE BITARTRATE AND ACETAMINOPHEN 5; 325 MG/1; MG/1
1 TABLET ORAL EVERY 6 HOURS PRN
Qty: 20 TABLET | Refills: 0 | Status: SHIPPED | OUTPATIENT
Start: 2021-11-28

## 2022-01-21 ENCOUNTER — TELEPHONE (OUTPATIENT)
Dept: NEUROLOGY | Facility: CLINIC | Age: 57
End: 2022-01-21
Payer: MEDICAID

## 2022-01-21 NOTE — TELEPHONE ENCOUNTER
----- Message from Arcadio Bautista MA sent at 1/21/2022  2:31 PM CST -----  Contact: ML REED [[id number]]  Type: Needs Medical Advice    Who Called: ML REED [[id number]]  Best Call Back Number: [phone]  Inquiry/Question: Would you kindly call ML RE[facility] [[id number]] regarding scheduling an sooner appt . Thank you~

## 2022-01-21 NOTE — TELEPHONE ENCOUNTER
Spoke to pt--he complains of recent memory loss. States he feels it is more short term memory. Scheduled the pt an appt for 3/3/22.

## 2022-03-02 ENCOUNTER — TELEPHONE (OUTPATIENT)
Dept: NEUROLOGY | Facility: CLINIC | Age: 57
End: 2022-03-02
Payer: MEDICAID

## 2022-03-02 NOTE — TELEPHONE ENCOUNTER
Call placed to patient to reschedule appointment from 03/03/22 with RAZA Burch .  Patient rescheduled for 03/21/22 at 10:00 AM.  Informed patient that with his insurance (Medicaid - requires referral).  Patient stated that his insurance called and set up this appointment up and they okayed the appointment.  Advised patient to call insurance company and verify.  Patient v/u .  Appointment letter sent to patient's address.

## 2022-03-23 ENCOUNTER — OFFICE VISIT (OUTPATIENT)
Dept: URGENT CARE | Facility: CLINIC | Age: 57
End: 2022-03-23
Payer: MEDICAID

## 2022-03-23 VITALS
OXYGEN SATURATION: 95 % | SYSTOLIC BLOOD PRESSURE: 152 MMHG | HEIGHT: 72 IN | DIASTOLIC BLOOD PRESSURE: 91 MMHG | BODY MASS INDEX: 37.93 KG/M2 | WEIGHT: 280 LBS | TEMPERATURE: 99 F | RESPIRATION RATE: 20 BRPM | HEART RATE: 84 BPM

## 2022-03-23 DIAGNOSIS — R05.9 COUGH: ICD-10-CM

## 2022-03-23 DIAGNOSIS — J06.9 VIRAL URI: Primary | ICD-10-CM

## 2022-03-23 DIAGNOSIS — J34.89 SINUS PRESSURE: ICD-10-CM

## 2022-03-23 LAB
CTP QC/QA: YES
CTP QC/QA: YES
FLUAV AG NPH QL: NEGATIVE
FLUBV AG NPH QL: NEGATIVE
SARS-COV-2 AG RESP QL IA.RAPID: NEGATIVE

## 2022-03-23 PROCEDURE — 1159F MED LIST DOCD IN RCRD: CPT | Mod: CPTII,S$GLB,, | Performed by: NURSE PRACTITIONER

## 2022-03-23 PROCEDURE — 3008F BODY MASS INDEX DOCD: CPT | Mod: CPTII,S$GLB,, | Performed by: NURSE PRACTITIONER

## 2022-03-23 PROCEDURE — 87811 SARS-COV-2 COVID19 W/OPTIC: CPT | Mod: S$GLB,,, | Performed by: NURSE PRACTITIONER

## 2022-03-23 PROCEDURE — 1159F PR MEDICATION LIST DOCUMENTED IN MEDICAL RECORD: ICD-10-PCS | Mod: CPTII,S$GLB,, | Performed by: NURSE PRACTITIONER

## 2022-03-23 PROCEDURE — 99214 PR OFFICE/OUTPT VISIT, EST, LEVL IV, 30-39 MIN: ICD-10-PCS | Mod: S$GLB,,, | Performed by: NURSE PRACTITIONER

## 2022-03-23 PROCEDURE — 87804 INFLUENZA ASSAY W/OPTIC: CPT | Mod: QW,,, | Performed by: NURSE PRACTITIONER

## 2022-03-23 PROCEDURE — 87804 POCT INFLUENZA A/B: ICD-10-PCS | Mod: QW,,, | Performed by: NURSE PRACTITIONER

## 2022-03-23 PROCEDURE — 3008F PR BODY MASS INDEX (BMI) DOCUMENTED: ICD-10-PCS | Mod: CPTII,S$GLB,, | Performed by: NURSE PRACTITIONER

## 2022-03-23 PROCEDURE — 87811 SARS CORONAVIRUS 2 ANTIGEN POCT, MANUAL READ: ICD-10-PCS | Mod: S$GLB,,, | Performed by: NURSE PRACTITIONER

## 2022-03-23 PROCEDURE — 99214 OFFICE O/P EST MOD 30 MIN: CPT | Mod: S$GLB,,, | Performed by: NURSE PRACTITIONER

## 2022-03-23 RX ORDER — METHYLPREDNISOLONE 4 MG/1
TABLET ORAL
Qty: 21 EACH | Refills: 0 | Status: SHIPPED | OUTPATIENT
Start: 2022-03-23 | End: 2022-04-13

## 2022-03-23 NOTE — PROGRESS NOTES
"Subjective:       Patient ID: Quinton Clayton is a 56 y.o. male.    Vitals:  height is 6' (1.829 m) and weight is 127 kg (280 lb). His temperature is 98.9 °F (37.2 °C). His blood pressure is 152/91 (abnormal) and his pulse is 84. His respiration is 20 and oxygen saturation is 95%.     Chief Complaint: Cough    Cough  This is a new problem. The current episode started in the past 7 days (friday). The problem has been gradually worsening. The cough is productive of sputum. Associated symptoms include headaches and rhinorrhea. Associated symptoms comments: Sinus preassure, body aches. Treatments tried: nyquil, tylenol flu, BC powder.       Respiratory: Positive for cough.    Neurological: Positive for headaches.       Objective:      Physical Exam   HENT:   Head: Normocephalic.   Ears:   Right Ear: Tympanic membrane, external ear and ear canal normal.   Left Ear: Tympanic membrane, external ear and ear canal normal.   Nose: Nose normal.   Mouth/Throat: Mucous membranes are moist. Oropharynx is clear.   Eyes:      extraocular movement intact   Cardiovascular: Normal heart sounds.   Pulmonary/Chest: Effort normal and breath sounds normal.   Abdominal: Normal appearance.   Neurological: He is alert.   Skin: Skin is warm. Capillary refill takes less than 2 seconds.   Nursing note and vitals reviewed.        Results for orders placed or performed in visit on 03/23/22   POCT Influenza A/B   Result Value Ref Range    Rapid Influenza A Ag Negative Negative    Rapid Influenza B Ag Negative Negative     Acceptable Yes    SARS Coronavirus 2 Antigen, POCT Manual Read   Result Value Ref Range    SARS Coronavirus 2 Antigen Negative Negative     Acceptable Yes      Assessment:       1. Viral URI    2. Cough    3. Sinus pressure          Plan:       Influenza A & B negative  Covid negative   Pt would like Medrol dose pack to help with "horrible sinus congestion and can't sleep"  Viral URI  -     " "methylPREDNISolone (MEDROL DOSEPACK) 4 mg tablet; use as directed  Dispense: 21 each; Refill: 0    Cough  -     POCT Influenza A/B  -     SARS Coronavirus 2 Antigen, POCT Manual Read    Sinus pressure  -     methylPREDNISolone (MEDROL DOSEPACK) 4 mg tablet; use as directed  Dispense: 21 each; Refill: 0           Patient Instructions                                                            URI   If your condition worsens or fails to improve we recommend that you receive another evaluation at the ER immediately or contact your PCP to discuss your concerns or return here. You must understand that you've received an urgent care treatment only and that you may be released before all your medical problems are known or treated. You the patient will arrange for follouwp care as instructed.   If we discussed that I think your illness is viral, it will not respond to antibiotics and will last 5-7 days. If we discussed "wait and see" antibiotics and if over the next few days the symptoms worsen start the antibiotics I have given you.   -  If you are female and on BCP and do take the antibiotics, use additional methods to prevent pregnancy while on the antibiotics and for one cycle after.   -  Flonase (fluticasone) is a nasal spray which is available over the counter and may help with your symptoms.   -  Zyrtec D, Claritin D or Allegra D can also help with symptoms of congestion and drainage.   -  If you have hypertension avoid using the "D" which is the decongestant.  Instead you can use Coricidin HBP for cold and cough symptoms.    -  If you just have drainage you can take plain Zyrtec, Claritin or Allegra   -  If you just have a congested feeling you can take pseudoephedrine (unless you have high blood pressure) which you have to sign for behind the counter. Do not buy the phenylephrine which is on the shelf as it is not effective   -  Rest and fluids are also important.   -  Tylenol or ibuprofen can also be used as " directed for pain unless you have an allergy to them or medical condition such as stomach ulcers, kidney or liver disease or blood thinners etc for which you should not be taking these type of medications.   -  If you are flying in the next few days Afrin nose drops for the airplane flight upon take off and landing may help. Other than at those times refrain from using afrin.   - If you were prescribed a narcotic do not drive or operate heavy machinery while taking these medications.

## 2022-03-30 ENCOUNTER — OFFICE VISIT (OUTPATIENT)
Dept: URGENT CARE | Facility: CLINIC | Age: 57
End: 2022-03-30
Payer: MEDICAID

## 2022-03-30 VITALS
HEIGHT: 72 IN | RESPIRATION RATE: 18 BRPM | OXYGEN SATURATION: 95 % | HEART RATE: 78 BPM | BODY MASS INDEX: 38.06 KG/M2 | TEMPERATURE: 98 F | SYSTOLIC BLOOD PRESSURE: 149 MMHG | WEIGHT: 281 LBS | DIASTOLIC BLOOD PRESSURE: 87 MMHG

## 2022-03-30 DIAGNOSIS — J06.9 VIRAL URI WITH COUGH: Primary | ICD-10-CM

## 2022-03-30 PROCEDURE — 3008F BODY MASS INDEX DOCD: CPT | Mod: CPTII,S$GLB,, | Performed by: PHYSICIAN ASSISTANT

## 2022-03-30 PROCEDURE — 3077F PR MOST RECENT SYSTOLIC BLOOD PRESSURE >= 140 MM HG: ICD-10-PCS | Mod: CPTII,S$GLB,, | Performed by: PHYSICIAN ASSISTANT

## 2022-03-30 PROCEDURE — 3008F PR BODY MASS INDEX (BMI) DOCUMENTED: ICD-10-PCS | Mod: CPTII,S$GLB,, | Performed by: PHYSICIAN ASSISTANT

## 2022-03-30 PROCEDURE — 3077F SYST BP >= 140 MM HG: CPT | Mod: CPTII,S$GLB,, | Performed by: PHYSICIAN ASSISTANT

## 2022-03-30 PROCEDURE — 3079F PR MOST RECENT DIASTOLIC BLOOD PRESSURE 80-89 MM HG: ICD-10-PCS | Mod: CPTII,S$GLB,, | Performed by: PHYSICIAN ASSISTANT

## 2022-03-30 PROCEDURE — 1159F MED LIST DOCD IN RCRD: CPT | Mod: CPTII,S$GLB,, | Performed by: PHYSICIAN ASSISTANT

## 2022-03-30 PROCEDURE — 99213 OFFICE O/P EST LOW 20 MIN: CPT | Mod: S$GLB,,, | Performed by: PHYSICIAN ASSISTANT

## 2022-03-30 PROCEDURE — 1159F PR MEDICATION LIST DOCUMENTED IN MEDICAL RECORD: ICD-10-PCS | Mod: CPTII,S$GLB,, | Performed by: PHYSICIAN ASSISTANT

## 2022-03-30 PROCEDURE — 3079F DIAST BP 80-89 MM HG: CPT | Mod: CPTII,S$GLB,, | Performed by: PHYSICIAN ASSISTANT

## 2022-03-30 PROCEDURE — 1160F PR REVIEW ALL MEDS BY PRESCRIBER/CLIN PHARMACIST DOCUMENTED: ICD-10-PCS | Mod: CPTII,S$GLB,, | Performed by: PHYSICIAN ASSISTANT

## 2022-03-30 PROCEDURE — 1160F RVW MEDS BY RX/DR IN RCRD: CPT | Mod: CPTII,S$GLB,, | Performed by: PHYSICIAN ASSISTANT

## 2022-03-30 PROCEDURE — 99213 PR OFFICE/OUTPT VISIT, EST, LEVL III, 20-29 MIN: ICD-10-PCS | Mod: S$GLB,,, | Performed by: PHYSICIAN ASSISTANT

## 2022-03-30 RX ORDER — CODEINE PHOSPHATE AND GUAIFENESIN 10; 100 MG/5ML; MG/5ML
5 SOLUTION ORAL 3 TIMES DAILY PRN
Qty: 236 ML | Refills: 0 | Status: SHIPPED | OUTPATIENT
Start: 2022-03-30 | End: 2022-04-09

## 2022-03-30 RX ORDER — FLUTICASONE PROPIONATE 50 MCG
1 SPRAY, SUSPENSION (ML) NASAL 2 TIMES DAILY
Qty: 9.9 ML | Refills: 0 | Status: SHIPPED | OUTPATIENT
Start: 2022-03-30 | End: 2023-01-18 | Stop reason: SDUPTHER

## 2022-03-30 NOTE — PROGRESS NOTES
Subjective:       Patient ID: Quinton Clayton is a 56 y.o. male.    Vitals:  height is 6' (1.829 m) and weight is 127.5 kg (281 lb). His oral temperature is 97.7 °F (36.5 °C). His blood pressure is 149/87 (abnormal) and his pulse is 78. His respiration is 18 and oxygen saturation is 95%.     Chief Complaint: Cough    Patient is a 56-year-old male who presents with cough and congestion for 6 days.  He was seen recently and had a negative flu and COVID test.  He was prescribed Medrol Dosepak at that time which he has completed.  He reports that the cold is starting to break up.  He is requesting something for his cough.  He denies fever.  He denies GI symptoms.  He denies shortness of breath.  He denies sinus pressure.    Cough  Pertinent negatives include no chills, fever, sore throat or shortness of breath.       Constitution: Negative for chills and fever.   HENT: Positive for congestion. Negative for sore throat.    Respiratory: Positive for cough. Negative for shortness of breath.    Gastrointestinal: Negative for abdominal pain, nausea, vomiting and diarrhea.       Objective:      Physical Exam   HENT:   Head: Normocephalic and atraumatic.   Ears:   Right Ear: Tympanic membrane, external ear and ear canal normal.   Left Ear: Tympanic membrane, external ear and ear canal normal.   Nose: Nose normal. Right sinus exhibits no maxillary sinus tenderness and no frontal sinus tenderness. Left sinus exhibits no maxillary sinus tenderness and no frontal sinus tenderness.   Mouth/Throat: Uvula is midline and oropharynx is clear and moist.   Cardiovascular: Normal rate and normal heart sounds.   Pulmonary/Chest: Effort normal and breath sounds normal. No stridor. He has no decreased breath sounds. He has no wheezes.   Nursing note and vitals reviewed.        Assessment:       1. Viral URI with cough          Plan:         Viral URI with cough  -     fluticasone propionate (FLONASE) 50 mcg/actuation nasal spray; 1 spray  (50 mcg total) by Each Nostril route 2 (two) times a day.  Dispense: 9.9 mL; Refill: 0  -     guaiFENesin-codeine 100-10 mg/5 ml (CHERATUSSIN AC)  mg/5 mL syrup; Take 5 mLs by mouth 3 (three) times daily as needed for Cough or Congestion.  Dispense: 236 mL; Refill: 0           Medical Decision Making:   History:   Old Medical Records: I decided to obtain old medical records.  Clinical Tests:   Lab Tests: Reviewed  Urgent Care Management:  Urgent evaluation of a well-appearing 56-year-old male who presents with persistent cough.  He was seen here approximately 5 days ago and had negative viral testing at that time.  He was given steroids with mild improvement.  He is here because the cough has persisted.  He denies fever.  He has clear and equal breath sounds bilaterally.  Oxygen saturation is stable.  He has no maxillary or frontal sinus tenderness to palpation.  Symptoms have been less than 1 week.  There is no indication for antibiotics.  Recommend continue symptomatic treatment at this time.  Just completed a course of steroids and no further steroid treatment. Return precautions given. Based on my clinical evaluation, I do not appreciate any immediate, emergent, or life threatening condition or etiology that warrants additional workup today and feel that the patient can be discharged with close follow up care.  Patient is to follow up with their primary care provider. All questions answered.

## 2022-03-30 NOTE — PROGRESS NOTES
"Subjective:       Patient ID: Quinton Clayton is a 56 y.o. male.    Vitals:  height is 6' (1.829 m) and weight is 127.5 kg (281 lb). His oral temperature is 97.7 °F (36.5 °C). His blood pressure is 149/87 (abnormal) and his pulse is 78. His respiration is 18 and oxygen saturation is 95%.     Chief Complaint: Cough    Pt states "he was here a couple days ago and needs some medicine to clear up."    Cough  This is a new problem. The current episode started in the past 7 days. The problem has been gradually worsening. Associated symptoms include headaches. Associated symptoms comments: Congestion  . Treatments tried: steroids. The treatment provided mild relief.       Respiratory: Positive for cough.    Neurological: Positive for headaches.       Objective:      Physical Exam      Assessment:       No diagnosis found.      Plan:         There are no diagnoses linked to this encounter.               "

## 2022-03-31 DIAGNOSIS — J32.9 SINUSITIS, UNSPECIFIED CHRONICITY, UNSPECIFIED LOCATION: Primary | ICD-10-CM

## 2022-03-31 RX ORDER — AMOXICILLIN AND CLAVULANATE POTASSIUM 875; 125 MG/1; MG/1
1 TABLET, FILM COATED ORAL EVERY 12 HOURS
Qty: 14 TABLET | Refills: 0 | Status: SHIPPED | OUTPATIENT
Start: 2022-03-31 | End: 2022-04-07

## 2022-10-19 ENCOUNTER — OFFICE VISIT (OUTPATIENT)
Dept: URGENT CARE | Facility: CLINIC | Age: 57
End: 2022-10-19
Payer: MEDICAID

## 2022-10-19 VITALS
RESPIRATION RATE: 20 BRPM | OXYGEN SATURATION: 96 % | HEIGHT: 72 IN | BODY MASS INDEX: 40.09 KG/M2 | WEIGHT: 296 LBS | HEART RATE: 73 BPM | SYSTOLIC BLOOD PRESSURE: 178 MMHG | TEMPERATURE: 98 F | DIASTOLIC BLOOD PRESSURE: 92 MMHG

## 2022-10-19 DIAGNOSIS — Z76.0 MEDICATION REFILL: ICD-10-CM

## 2022-10-19 DIAGNOSIS — M10.9 ACUTE GOUT INVOLVING TOE OF RIGHT FOOT, UNSPECIFIED CAUSE: Primary | ICD-10-CM

## 2022-10-19 DIAGNOSIS — J06.9 URI WITH COUGH AND CONGESTION: ICD-10-CM

## 2022-10-19 PROCEDURE — 1160F RVW MEDS BY RX/DR IN RCRD: CPT | Mod: CPTII,S$GLB,, | Performed by: NURSE PRACTITIONER

## 2022-10-19 PROCEDURE — 99214 PR OFFICE/OUTPT VISIT, EST, LEVL IV, 30-39 MIN: ICD-10-PCS | Mod: S$GLB,,, | Performed by: NURSE PRACTITIONER

## 2022-10-19 PROCEDURE — 3077F SYST BP >= 140 MM HG: CPT | Mod: CPTII,S$GLB,, | Performed by: NURSE PRACTITIONER

## 2022-10-19 PROCEDURE — 3080F PR MOST RECENT DIASTOLIC BLOOD PRESSURE >= 90 MM HG: ICD-10-PCS | Mod: CPTII,S$GLB,, | Performed by: NURSE PRACTITIONER

## 2022-10-19 PROCEDURE — 3080F DIAST BP >= 90 MM HG: CPT | Mod: CPTII,S$GLB,, | Performed by: NURSE PRACTITIONER

## 2022-10-19 PROCEDURE — 1160F PR REVIEW ALL MEDS BY PRESCRIBER/CLIN PHARMACIST DOCUMENTED: ICD-10-PCS | Mod: CPTII,S$GLB,, | Performed by: NURSE PRACTITIONER

## 2022-10-19 PROCEDURE — 1159F MED LIST DOCD IN RCRD: CPT | Mod: CPTII,S$GLB,, | Performed by: NURSE PRACTITIONER

## 2022-10-19 PROCEDURE — 1159F PR MEDICATION LIST DOCUMENTED IN MEDICAL RECORD: ICD-10-PCS | Mod: CPTII,S$GLB,, | Performed by: NURSE PRACTITIONER

## 2022-10-19 PROCEDURE — 3077F PR MOST RECENT SYSTOLIC BLOOD PRESSURE >= 140 MM HG: ICD-10-PCS | Mod: CPTII,S$GLB,, | Performed by: NURSE PRACTITIONER

## 2022-10-19 PROCEDURE — 99214 OFFICE O/P EST MOD 30 MIN: CPT | Mod: S$GLB,,, | Performed by: NURSE PRACTITIONER

## 2022-10-19 RX ORDER — INDOMETHACIN 50 MG/1
50 CAPSULE ORAL 3 TIMES DAILY PRN
Qty: 21 CAPSULE | Refills: 0 | Status: SHIPPED | OUTPATIENT
Start: 2022-10-19 | End: 2022-10-26

## 2022-10-19 RX ORDER — PANTOPRAZOLE SODIUM 40 MG/1
40 TABLET, DELAYED RELEASE ORAL DAILY
Qty: 30 TABLET | Refills: 11 | Status: SHIPPED | OUTPATIENT
Start: 2022-10-19 | End: 2022-10-19 | Stop reason: ALTCHOICE

## 2022-10-19 RX ORDER — PANTOPRAZOLE SODIUM 40 MG/1
40 TABLET, DELAYED RELEASE ORAL DAILY
Qty: 14 TABLET | Refills: 0 | Status: SHIPPED | OUTPATIENT
Start: 2022-10-19 | End: 2023-01-18 | Stop reason: SDUPTHER

## 2022-10-19 RX ORDER — BENZONATATE 100 MG/1
100 CAPSULE ORAL 3 TIMES DAILY PRN
Qty: 21 CAPSULE | Refills: 0 | Status: SHIPPED | OUTPATIENT
Start: 2022-10-19 | End: 2022-10-26

## 2022-10-19 RX ORDER — PROMETHAZINE HYDROCHLORIDE AND DEXTROMETHORPHAN HYDROBROMIDE 6.25; 15 MG/5ML; MG/5ML
5 SYRUP ORAL NIGHTLY PRN
Qty: 118 ML | Refills: 0 | Status: SHIPPED | OUTPATIENT
Start: 2022-10-19 | End: 2022-10-29

## 2022-10-19 RX ORDER — LEVOCETIRIZINE DIHYDROCHLORIDE 5 MG/1
5 TABLET, FILM COATED ORAL DAILY PRN
Qty: 7 TABLET | Refills: 0 | Status: SHIPPED | OUTPATIENT
Start: 2022-10-19 | End: 2023-01-18 | Stop reason: SDUPTHER

## 2022-10-19 RX ORDER — GUAIFENESIN AND DEXTROMETHORPHAN HYDROBROMIDE 20; 400 MG/1; MG/1
1 TABLET ORAL EVERY 4 HOURS PRN
Qty: 30 EACH | Refills: 0 | Status: SHIPPED | OUTPATIENT
Start: 2022-10-19 | End: 2022-10-29

## 2022-10-19 NOTE — PROGRESS NOTES
"Subjective:       Patient ID: Quinton Clayton is a 57 y.o. male.    Vitals:  height is 6' (1.829 m) and weight is 134.3 kg (296 lb). His oral temperature is 98.1 °F (36.7 °C). His blood pressure is 178/92 (abnormal) and his pulse is 73. His respiration is 20 and oxygen saturation is 96%.     Chief Complaint: Sinus Problem    Pt also states "Having a gout flare up in the right foot great toe x's 2 days and wanting a refill on his meds."    Sinus Problem  This is a new problem. Episode onset: 2 days ago. The problem has been gradually worsening since onset. There has been no fever. Associated symptoms include congestion, coughing and headaches. Pertinent negatives include no chills, ear pain, shortness of breath or sore throat. Past treatments include nothing.     Constitution: Negative for chills and fever.   HENT:  Positive for congestion and postnasal drip. Negative for ear pain and sore throat.    Neck: Negative for painful lymph nodes.   Cardiovascular:  Negative for chest pain, palpitations and sob on exertion.   Respiratory:  Positive for cough and sputum production. Negative for shortness of breath.    Musculoskeletal:  Positive for pain, joint pain and joint swelling.   Skin:  Negative for rash.   Neurological:  Positive for headaches. Negative for dizziness, light-headedness, passing out, disorientation and altered mental status.   Hematologic/Lymphatic: Negative for swollen lymph nodes.   Psychiatric/Behavioral:  Negative for altered mental status and disorientation.      Objective:      Physical Exam   Constitutional: He is oriented to person, place, and time. He appears well-developed. He is cooperative.  Non-toxic appearance. He does not appear ill. No distress.   HENT:   Head: Normocephalic and atraumatic.   Ears:   Right Ear: Hearing, tympanic membrane, external ear and ear canal normal.   Left Ear: Hearing, tympanic membrane, external ear and ear canal normal.   Nose: Rhinorrhea and congestion present. " No mucosal edema, purulent discharge or nasal deformity. No epistaxis. Right sinus exhibits no maxillary sinus tenderness and no frontal sinus tenderness. Left sinus exhibits no maxillary sinus tenderness and no frontal sinus tenderness.   Mouth/Throat: Uvula is midline and mucous membranes are normal. Mucous membranes are moist. No trismus in the jaw. Normal dentition. No uvula swelling. Posterior oropharyngeal erythema present. No oropharyngeal exudate, posterior oropharyngeal edema, tonsillar abscesses or cobblestoning. Tonsils are 1+ on the right. Tonsils are 1+ on the left. No tonsillar exudate.   Eyes: Conjunctivae and lids are normal. No scleral icterus.   Neck: Trachea normal and phonation normal. Neck supple. No edema present. No erythema present. No neck rigidity present.   Cardiovascular: Normal rate, regular rhythm, normal heart sounds and normal pulses.   Pulmonary/Chest: Effort normal and breath sounds normal. No respiratory distress. He has no decreased breath sounds. He has no rhonchi.   Abdominal: Normal appearance.   Musculoskeletal: Normal range of motion.         General: No deformity. Normal range of motion.        Feet:    Lymphadenopathy:     He has no cervical adenopathy.   Neurological: He is alert and oriented to person, place, and time. He exhibits normal muscle tone. Coordination normal.   Skin: Skin is warm, dry, intact, not diaphoretic and not pale. Capillary refill takes 2 to 3 seconds.   Psychiatric: His speech is normal and behavior is normal. Judgment and thought content normal.   Nursing note and vitals reviewed.      Assessment:       1. Acute gout involving toe of right foot, unspecified cause    2. URI with cough and congestion          Plan:         Acute gout involving toe of right foot, unspecified cause  -     indomethacin (INDOCIN) 50 MG capsule; Take 1 capsule (50 mg total) by mouth 3 (three) times daily as needed (gout pain). Use for shortest duration necessary  Dispense:  21 capsule; Refill: 0    URI with cough and congestion  -     benzocaine-menthoL 6-10 mg lozenge; Take 1 lozenge by mouth every 2 (two) hours as needed for Pain.  Dispense: 18 tablet; Refill: 0  -     levocetirizine (XYZAL) 5 MG tablet; Take 1 tablet (5 mg total) by mouth daily as needed for Allergies.  Dispense: 7 tablet; Refill: 0  -     benzonatate (TESSALON) 100 MG capsule; Take 1 capsule (100 mg total) by mouth 3 (three) times daily as needed for Cough.  Dispense: 21 capsule; Refill: 0  -     dextromethorphan-guaiFENesin  mg Tab; Take 1 tablet by mouth every 4 (four) hours as needed (cough/congestion).  Dispense: 30 each; Refill: 0  -     promethazine-dextromethorphan (PROMETHAZINE-DM) 6.25-15 mg/5 mL Syrp; Take 5 mLs by mouth nightly as needed (cough). Take as needed for nighttime coughing  Dispense: 118 mL; Refill: 0           I have discussed the physical exam findings with the patient. He refused any point of care testing. We discussed establishing care with a PCP for further management. We also discussed symptom monitoring, conservative care methods, medication use, and follow up orders. The patient verbalized understanding and agreement with the plan of care.             Increase clear fluid intake  Maintain low purine diet  Stop all over the counter cough, cold, flu medicine  Tylenol/motrin otc for fever or pain  Flonase nasal spray and xyzal  for sinus congestion and pressure.  Take Mucinex DM as needed for chest congestion and tessalon perles as needed for daytime coughing. Take mucinex with a full glass of water at each dose. May also take tessalon for daytime cough.  May take promethazine cough syrup as needed for nighttime cough.  Saltwater gargles 4 x daily and Benzocaine anesthetic throat lozenges for sore throat. May also add honey based cough syrup  Take indomethacin as prescribed for gout pain. After resolution of pain see your PCP for further workup and management including allopurinol  maintenance  Follow up with PCP  Go immediately to the nearest emergency room for shortness of breath, chest pain,  or other emergent concern.  Return to clinic for new, worse, or unresolving symptoms

## 2022-10-19 NOTE — PATIENT INSTRUCTIONS
Increase clear fluid intake  Maintain low purine diet  Stop all over the counter cough, cold, flu medicine  Tylenol/motrin otc for fever or pain  Flonase nasal spray and xyzal  for sinus congestion and pressure.  Take Mucinex DM as needed for chest congestion and tessalon perles as needed for daytime coughing. Take mucinex with a full glass of water at each dose. May also take tessalon for daytime cough.  May take promethazine cough syrup as needed for nighttime cough.  Saltwater gargles 4 x daily and Benzocaine anesthetic throat lozenges for sore throat. May also add honey based cough syrup  Take indomethacin as prescribed for gout pain. After resolution of pain see your PCP for further workup and management including allopurinol maintenance  Follow up with PCP  Go immediately to the nearest emergency room for shortness of breath, chest pain,  or other emergent concern.  Return to clinic for new, worse, or unresolving symptoms

## 2023-01-18 ENCOUNTER — OFFICE VISIT (OUTPATIENT)
Dept: URGENT CARE | Facility: CLINIC | Age: 58
End: 2023-01-18
Payer: MEDICAID

## 2023-01-18 VITALS
BODY MASS INDEX: 39.17 KG/M2 | HEART RATE: 70 BPM | SYSTOLIC BLOOD PRESSURE: 140 MMHG | RESPIRATION RATE: 20 BRPM | TEMPERATURE: 97 F | DIASTOLIC BLOOD PRESSURE: 100 MMHG | HEIGHT: 72 IN | WEIGHT: 289.19 LBS | OXYGEN SATURATION: 97 %

## 2023-01-18 DIAGNOSIS — I10 PRIMARY HYPERTENSION: ICD-10-CM

## 2023-01-18 DIAGNOSIS — K21.9 GASTROESOPHAGEAL REFLUX DISEASE, UNSPECIFIED WHETHER ESOPHAGITIS PRESENT: ICD-10-CM

## 2023-01-18 DIAGNOSIS — J02.9 SORE THROAT: ICD-10-CM

## 2023-01-18 DIAGNOSIS — J06.9 URI WITH COUGH AND CONGESTION: Primary | ICD-10-CM

## 2023-01-18 PROCEDURE — 99214 OFFICE O/P EST MOD 30 MIN: CPT | Mod: S$GLB,,, | Performed by: NURSE PRACTITIONER

## 2023-01-18 PROCEDURE — 3008F PR BODY MASS INDEX (BMI) DOCUMENTED: ICD-10-PCS | Mod: CPTII,S$GLB,, | Performed by: NURSE PRACTITIONER

## 2023-01-18 PROCEDURE — 99214 PR OFFICE/OUTPT VISIT, EST, LEVL IV, 30-39 MIN: ICD-10-PCS | Mod: S$GLB,,, | Performed by: NURSE PRACTITIONER

## 2023-01-18 PROCEDURE — 1160F PR REVIEW ALL MEDS BY PRESCRIBER/CLIN PHARMACIST DOCUMENTED: ICD-10-PCS | Mod: CPTII,S$GLB,, | Performed by: NURSE PRACTITIONER

## 2023-01-18 PROCEDURE — 1159F PR MEDICATION LIST DOCUMENTED IN MEDICAL RECORD: ICD-10-PCS | Mod: CPTII,S$GLB,, | Performed by: NURSE PRACTITIONER

## 2023-01-18 PROCEDURE — 3080F PR MOST RECENT DIASTOLIC BLOOD PRESSURE >= 90 MM HG: ICD-10-PCS | Mod: CPTII,S$GLB,, | Performed by: NURSE PRACTITIONER

## 2023-01-18 PROCEDURE — 3077F SYST BP >= 140 MM HG: CPT | Mod: CPTII,S$GLB,, | Performed by: NURSE PRACTITIONER

## 2023-01-18 PROCEDURE — 3077F PR MOST RECENT SYSTOLIC BLOOD PRESSURE >= 140 MM HG: ICD-10-PCS | Mod: CPTII,S$GLB,, | Performed by: NURSE PRACTITIONER

## 2023-01-18 PROCEDURE — 3008F BODY MASS INDEX DOCD: CPT | Mod: CPTII,S$GLB,, | Performed by: NURSE PRACTITIONER

## 2023-01-18 PROCEDURE — 1160F RVW MEDS BY RX/DR IN RCRD: CPT | Mod: CPTII,S$GLB,, | Performed by: NURSE PRACTITIONER

## 2023-01-18 PROCEDURE — 1159F MED LIST DOCD IN RCRD: CPT | Mod: CPTII,S$GLB,, | Performed by: NURSE PRACTITIONER

## 2023-01-18 PROCEDURE — 3080F DIAST BP >= 90 MM HG: CPT | Mod: CPTII,S$GLB,, | Performed by: NURSE PRACTITIONER

## 2023-01-18 RX ORDER — FLUTICASONE PROPIONATE 50 MCG
2 SPRAY, SUSPENSION (ML) NASAL DAILY PRN
Qty: 15.8 ML | Refills: 0 | Status: SHIPPED | OUTPATIENT
Start: 2023-01-18 | End: 2023-02-01

## 2023-01-18 RX ORDER — GUAIFENESIN AND DEXTROMETHORPHAN HYDROBROMIDE 20; 400 MG/1; MG/1
1 TABLET ORAL EVERY 4 HOURS PRN
Qty: 30 EACH | Refills: 0 | Status: SHIPPED | OUTPATIENT
Start: 2023-01-18 | End: 2023-01-28

## 2023-01-18 RX ORDER — LEVOCETIRIZINE DIHYDROCHLORIDE 5 MG/1
5 TABLET, FILM COATED ORAL DAILY PRN
Qty: 7 TABLET | Refills: 0 | Status: SHIPPED | OUTPATIENT
Start: 2023-01-18 | End: 2023-01-25

## 2023-01-18 RX ORDER — PANTOPRAZOLE SODIUM 40 MG/1
40 TABLET, DELAYED RELEASE ORAL DAILY
Qty: 30 TABLET | Refills: 0 | Status: SHIPPED | OUTPATIENT
Start: 2023-01-18 | End: 2023-02-17

## 2023-01-18 RX ORDER — AMLODIPINE BESYLATE 10 MG/1
10 TABLET ORAL DAILY
Qty: 30 TABLET | Refills: 0 | Status: SHIPPED | OUTPATIENT
Start: 2023-01-18 | End: 2023-02-17

## 2023-01-18 NOTE — PATIENT INSTRUCTIONS
Stop all current over the counter cough, cold, flu medicine.  Most of these have decongestants that will raise her blood pressure  Restart amlodipine immediately.  Take your blood pressure twice daily and keep a log.  Record the time of day and watch her pressure is and keep this log.  When you are seen by your primary care provider present this log so that he may better titrate her medication  Tylenol/motrin otc for fever or pain  Flonase nasal spray and Xyzal for sinus congestion and pressure.    Mucinex DM as needed for cough.  Take each dose large glass of water  Saltwater gargles 4 x daily and benzocaine anesthetic throat lozenges for sore throat. May also add honey based cough syrup  Follow up with PCP referral  Go immediately to the nearest emergency room for shortness of breath, chest pain,  headache unresolved by medication or other emergent concern.  Return to clinic for new, worse, or unresolving symptoms

## 2023-01-18 NOTE — PROGRESS NOTES
Subjective:       Patient ID: Quinton Clayton is a 57 y.o. male.    Vitals:  height is 6' (1.829 m) and weight is 131.2 kg (289 lb 3.2 oz). His temperature is 97.1 °F (36.2 °C). His blood pressure is 200/111 (abnormal) and his pulse is 66. His respiration is 20 and oxygen saturation is 97%.     Chief Complaint: Cough    57-year-old male seen today for upper respiratory symptoms and sore throat.  Patient extremely hypertensive yet remains asymptomatic.  He is known to this provider for coming in for medication refills and being noncompliant with following up with primary care.  He denies taking any over-the-counter products and states he ran out of blood pressure and gastric reflux medication approximately 1 month ago.  States his PCP appointment in approximately 1 month away.    Cough  The current episode started in the past 7 days (3 days). Associated symptoms include rhinorrhea and a sore throat. Pertinent negatives include no chest pain, chills, ear pain, fever, headaches, rash or shortness of breath.     Constitution: Negative for chills and fever.   HENT:  Positive for congestion and sore throat. Negative for ear pain, sinus pressure, trouble swallowing and voice change.    Neck: Negative for painful lymph nodes.   Cardiovascular:  Negative for chest pain, palpitations and sob on exertion.   Eyes:  Negative for vision loss.   Respiratory:  Positive for cough and sputum production. Negative for shortness of breath and stridor.    Gastrointestinal:  Negative for abdominal pain.   Musculoskeletal:  Negative for back pain.   Skin:  Negative for rash.   Neurological:  Negative for dizziness, light-headedness, passing out, headaches, disorientation, altered mental status, numbness and tingling.   Hematologic/Lymphatic: Negative for swollen lymph nodes.   Psychiatric/Behavioral:  Negative for altered mental status, disorientation and confusion.      Objective:      Physical Exam   Constitutional: He is oriented to  person, place, and time. He appears well-developed. He is cooperative.  Non-toxic appearance. He does not appear ill. No distress.   HENT:   Head: Normocephalic and atraumatic.   Ears:   Right Ear: Hearing, tympanic membrane, external ear and ear canal normal.   Left Ear: Hearing, tympanic membrane, external ear and ear canal normal.   Nose: Mucosal edema, rhinorrhea and congestion present. No nasal deformity. No epistaxis. Right sinus exhibits no maxillary sinus tenderness and no frontal sinus tenderness. Left sinus exhibits no maxillary sinus tenderness and no frontal sinus tenderness.   Mouth/Throat: Uvula is midline and mucous membranes are normal. Mucous membranes are moist. No trismus in the jaw. Normal dentition. No uvula swelling. Posterior oropharyngeal erythema present. No oropharyngeal exudate, posterior oropharyngeal edema, tonsillar abscesses or cobblestoning. Tonsils are 1+ on the right. Tonsils are 1+ on the left. No tonsillar exudate. Oropharynx is clear.   Eyes: Conjunctivae and lids are normal. No scleral icterus.   Neck: Trachea normal and phonation normal. Neck supple. No edema present. No erythema present. No neck rigidity present.   Cardiovascular: Normal rate, regular rhythm, normal heart sounds and normal pulses.   Pulmonary/Chest: Effort normal and breath sounds normal. No stridor. No respiratory distress. He has no decreased breath sounds. He has no rhonchi.   Abdominal: Normal appearance.   Musculoskeletal: Normal range of motion.         General: No deformity. Normal range of motion.   Lymphadenopathy:     He has no cervical adenopathy.   Neurological: no focal deficit. He is alert, oriented to person, place, and time and at baseline. He exhibits normal muscle tone. Coordination normal.   Skin: Skin is warm, dry, intact, not diaphoretic and not pale. Capillary refill takes 2 to 3 seconds.   Psychiatric: His speech is normal and behavior is normal. Judgment and thought content normal.    Nursing note and vitals reviewed.      Assessment:       1. Viral upper respiratory tract infection    2. Primary hypertension    3. Gastroesophageal reflux disease, unspecified whether esophagitis present          Plan:         Viral upper respiratory tract infection  -     fluticasone propionate (FLONASE) 50 mcg/actuation nasal spray; 2 sprays (100 mcg total) by Each Nostril route daily as needed for Rhinitis.  Dispense: 15.8 mL; Refill: 0  -     levocetirizine (XYZAL) 5 MG tablet; Take 1 tablet (5 mg total) by mouth daily as needed for Allergies.  Dispense: 7 tablet; Refill: 0    Primary hypertension  -     amLODIPine (NORVASC) 10 MG tablet; Take 1 tablet (10 mg total) by mouth once daily.  Dispense: 30 tablet; Refill: 0  -     Ambulatory referral/consult to Family Practice    Gastroesophageal reflux disease, unspecified whether esophagitis present  -     pantoprazole (PROTONIX) 40 MG tablet; Take 1 tablet (40 mg total) by mouth once daily.  Dispense: 30 tablet; Refill: 0         I have discussed the test results and physical exam findings with the patient.  We discussed hypertension at length and the need for compliance with his medical treatment.  We discussed that urgent Care is not the appropriate setting for routine medication refills.  There is no acute distress today and he remains asymptomatic despite the extreme level of his blood pressure.  I advised him that since he is having difficulty getting in with his PCP I am meeting a new referral for family care.  We discussed symptom monitoring, conservative care methods, medication use, and follow up orders.  He verbalized understanding and agreement with the plan of care.         Stop all current over the counter cough, cold, flu medicine.  Most of these have decongestants that will raise her blood pressure  Restart amlodipine immediately.  Take your blood pressure twice daily and keep a log.  Record the time of day and watch her pressure is and keep this  log.  When you are seen by your primary care provider present this log so that he may better titrate her medication  Tylenol/motrin otc for fever or pain  Flonase nasal spray and Xyzal for sinus congestion and pressure.    Mucinex DM as needed for cough.  Take each dose large glass of water  Saltwater gargles 4 x daily and benzocaine anesthetic throat lozenges for sore throat. May also add honey based cough syrup  Follow up with PCP referral  Go immediately to the nearest emergency room for shortness of breath, chest pain,  headache unresolved by medication or other emergent concern.  Return to clinic for new, worse, or unresolving symptoms

## 2023-07-08 ENCOUNTER — OFFICE VISIT (OUTPATIENT)
Dept: URGENT CARE | Facility: CLINIC | Age: 58
End: 2023-07-08
Payer: MEDICAID

## 2023-07-08 VITALS
HEIGHT: 71 IN | SYSTOLIC BLOOD PRESSURE: 147 MMHG | TEMPERATURE: 98 F | HEART RATE: 85 BPM | OXYGEN SATURATION: 94 % | BODY MASS INDEX: 39.06 KG/M2 | DIASTOLIC BLOOD PRESSURE: 79 MMHG | RESPIRATION RATE: 20 BRPM | WEIGHT: 279 LBS

## 2023-07-08 DIAGNOSIS — R09.89 CHEST CONGESTION: ICD-10-CM

## 2023-07-08 DIAGNOSIS — B34.9 HEADACHE DUE TO VIRAL INFECTION: ICD-10-CM

## 2023-07-08 DIAGNOSIS — R09.81 NASAL CONGESTION: ICD-10-CM

## 2023-07-08 DIAGNOSIS — R51.9 HEADACHE DUE TO VIRAL INFECTION: ICD-10-CM

## 2023-07-08 DIAGNOSIS — J22 LOWER RESPIRATORY TRACT INFECTION DUE TO COVID-19 VIRUS: Primary | ICD-10-CM

## 2023-07-08 DIAGNOSIS — R05.9 COUGH IN ADULT: ICD-10-CM

## 2023-07-08 DIAGNOSIS — U07.1 LOWER RESPIRATORY TRACT INFECTION DUE TO COVID-19 VIRUS: Primary | ICD-10-CM

## 2023-07-08 PROCEDURE — 87804 INFLUENZA ASSAY W/OPTIC: CPT | Mod: QW,,,

## 2023-07-08 PROCEDURE — 99214 OFFICE O/P EST MOD 30 MIN: CPT | Mod: S$GLB,,,

## 2023-07-08 PROCEDURE — 87811 SARS-COV-2 COVID19 W/OPTIC: CPT | Mod: QW,S$GLB,,

## 2023-07-08 PROCEDURE — 87804 POCT INFLUENZA A/B: ICD-10-PCS | Mod: QW,,,

## 2023-07-08 PROCEDURE — 99214 PR OFFICE/OUTPT VISIT, EST, LEVL IV, 30-39 MIN: ICD-10-PCS | Mod: S$GLB,,,

## 2023-07-08 PROCEDURE — 87811 SARS CORONAVIRUS 2 ANTIGEN POCT, MANUAL READ: ICD-10-PCS | Mod: QW,S$GLB,,

## 2023-07-08 RX ORDER — AMLODIPINE BESYLATE 10 MG/1
1 TABLET ORAL DAILY
COMMUNITY

## 2023-07-08 RX ORDER — METHYLPREDNISOLONE 4 MG/1
TABLET ORAL
Qty: 21 EACH | Refills: 0 | Status: SHIPPED | OUTPATIENT
Start: 2023-07-08 | End: 2023-07-29

## 2023-07-08 RX ORDER — AMOXICILLIN AND CLAVULANATE POTASSIUM 875; 125 MG/1; MG/1
1 TABLET, FILM COATED ORAL 2 TIMES DAILY
Qty: 10 TABLET | Refills: 0 | Status: SHIPPED | OUTPATIENT
Start: 2023-07-08 | End: 2023-07-13

## 2023-07-08 RX ORDER — PANTOPRAZOLE SODIUM 40 MG/1
1 TABLET, DELAYED RELEASE ORAL DAILY
COMMUNITY

## 2023-07-08 RX ORDER — BENZONATATE 100 MG/1
100 CAPSULE ORAL 3 TIMES DAILY PRN
Qty: 30 CAPSULE | Refills: 0 | Status: SHIPPED | OUTPATIENT
Start: 2023-07-08 | End: 2023-07-18

## 2023-07-08 NOTE — PROGRESS NOTES
"Subjective:      Patient ID: Quinton Clayton is a 58 y.o. male.    Vitals:  height is 5' 11" (1.803 m) and weight is 126.6 kg (279 lb). His temperature is 98.2 °F (36.8 °C). His blood pressure is 147/79 (abnormal) and his pulse is 85. His respiration is 20 and oxygen saturation is 94% (abnormal).     Chief Complaint: Chest Congestion    Pt states" c/o productive cough, chest congestion, nasal congestion that has been going on for 4 days. Took night quil."     ROS   Objective:     Physical Exam    Assessment:     1. Chest congestion        Plan:       Chest congestion  -     SARS Coronavirus 2 Antigen, POCT Manual Read  -     POCT Influenza A/B Rapid Antigen                    "

## 2023-07-08 NOTE — PATIENT INSTRUCTIONS
Flu and COIVD tests are negative    Please take antibiotic and steroid pack in their entirety as prescribed.   Use tessalon pearls three times per day as needed for cough.     Increase fluid intake for duration of antibiotics.

## 2023-07-08 NOTE — PROGRESS NOTES
"Subjective:      Patient ID: Quinton Clayton is a 58 y.o. male.    Vitals:  height is 5' 11" (1.803 m) and weight is 126.6 kg (279 lb). His temperature is 98.2 °F (36.8 °C). His blood pressure is 147/79 (abnormal) and his pulse is 85. His respiration is 20 and oxygen saturation is 94% (abnormal).     Chief Complaint: Chest Congestion    Patient had COVID 2 weeks ago and since then has had a lingering cough and chest congestion. Mucus is thick and yellow. Denies fever/chills/nausea/vomiting.     Constitution: Positive for fatigue. Negative for chills and fever.   HENT:  Positive for sore throat. Negative for ear pain, ear discharge, sinus pain and sinus pressure.    Neck: Negative for neck pain and neck stiffness.   Cardiovascular:  Negative for chest pain and sob on exertion.   Eyes: Negative.    Respiratory:  Positive for cough and shortness of breath. Negative for chest tightness, stridor and wheezing.    Gastrointestinal:  Negative for abdominal pain, nausea, vomiting and diarrhea.   Endocrine: negative.   Genitourinary: Negative.    Musculoskeletal: Negative.    Skin: Negative.    Allergic/Immunologic: Negative.    Neurological:  Positive for headaches. Negative for dizziness.   Hematologic/Lymphatic: Negative.    Psychiatric/Behavioral: Negative.      Objective:     Physical Exam   Constitutional: He is oriented to person, place, and time.  Non-toxic appearance. He appears ill. No distress. normal  HENT:   Head: Normocephalic and atraumatic.   Ears:   Right Ear: Tympanic membrane, external ear and ear canal normal.   Left Ear: Tympanic membrane, external ear and ear canal normal.   Nose: Nose normal.   Mouth/Throat: Mucous membranes are moist. Oropharynx is clear.   Eyes: Pupils are equal, round, and reactive to light.   Cardiovascular: Normal rate, regular rhythm, normal heart sounds and normal pulses.   Pulmonary/Chest: Effort normal and breath sounds normal. No respiratory distress. He has no wheezes. He has " no rhonchi. He has no rales.   Abdominal: Normal appearance. He exhibits no distension. There is no abdominal tenderness.   Musculoskeletal: Normal range of motion.         General: Normal range of motion.   Neurological: no focal deficit. He is alert and oriented to person, place, and time. He displays no weakness.   Skin: Skin is warm and dry. Capillary refill takes less than 2 seconds.   Psychiatric: His behavior is normal. Mood, judgment and thought content normal.   Nursing note and vitals reviewed.    Assessment:     1. Lower respiratory tract infection due to COVID-19 virus    2. Chest congestion    3. Cough in adult    4. Headache due to viral infection    5. Nasal congestion        Plan:       Lower respiratory tract infection due to COVID-19 virus  -     methylPREDNISolone (MEDROL DOSEPACK) 4 mg tablet; use as directed  Dispense: 21 each; Refill: 0  -     amoxicillin-clavulanate 875-125mg (AUGMENTIN) 875-125 mg per tablet; Take 1 tablet by mouth 2 (two) times a day. for 5 days  Dispense: 10 tablet; Refill: 0    Chest congestion  -     SARS Coronavirus 2 Antigen, POCT Manual Read  -     POCT Influenza A/B Rapid Antigen    Cough in adult  -     benzonatate (TESSALON) 100 MG capsule; Take 1 capsule (100 mg total) by mouth 3 (three) times daily as needed for Cough.  Dispense: 30 capsule; Refill: 0  -     methylPREDNISolone (MEDROL DOSEPACK) 4 mg tablet; use as directed  Dispense: 21 each; Refill: 0    Headache due to viral infection    Nasal congestion  -     methylPREDNISolone (MEDROL DOSEPACK) 4 mg tablet; use as directed  Dispense: 21 each; Refill: 0      Flu and COVID were negative.     Take medication as prescribed and return if symptoms worsen or don't improve

## 2023-07-20 ENCOUNTER — OFFICE VISIT (OUTPATIENT)
Dept: URGENT CARE | Facility: CLINIC | Age: 58
End: 2023-07-20
Payer: MEDICAID

## 2023-07-20 VITALS
OXYGEN SATURATION: 98 % | WEIGHT: 283 LBS | DIASTOLIC BLOOD PRESSURE: 80 MMHG | HEIGHT: 71 IN | RESPIRATION RATE: 16 BRPM | BODY MASS INDEX: 39.62 KG/M2 | HEART RATE: 77 BPM | SYSTOLIC BLOOD PRESSURE: 138 MMHG | TEMPERATURE: 98 F

## 2023-07-20 DIAGNOSIS — R05.2 SUBACUTE COUGH: Primary | ICD-10-CM

## 2023-07-20 DIAGNOSIS — J31.0 CHRONIC RHINITIS: ICD-10-CM

## 2023-07-20 PROCEDURE — 99214 OFFICE O/P EST MOD 30 MIN: CPT | Mod: S$GLB,,,

## 2023-07-20 PROCEDURE — 71046 X-RAY EXAM CHEST 2 VIEWS: CPT | Mod: S$GLB,,, | Performed by: RADIOLOGY

## 2023-07-20 PROCEDURE — 71046 XR CHEST PA AND LATERAL: ICD-10-PCS | Mod: S$GLB,,, | Performed by: RADIOLOGY

## 2023-07-20 PROCEDURE — 99214 PR OFFICE/OUTPT VISIT, EST, LEVL IV, 30-39 MIN: ICD-10-PCS | Mod: S$GLB,,,

## 2023-07-20 RX ORDER — PROMETHAZINE HYDROCHLORIDE AND DEXTROMETHORPHAN HYDROBROMIDE 6.25; 15 MG/5ML; MG/5ML
5 SYRUP ORAL NIGHTLY PRN
Qty: 50 ML | Refills: 0 | Status: SHIPPED | OUTPATIENT
Start: 2023-07-20 | End: 2023-07-30

## 2023-07-20 RX ORDER — AZELASTINE HYDROCHLORIDE, FLUTICASONE PROPIONATE 137; 50 UG/1; UG/1
1 SPRAY, METERED NASAL 2 TIMES DAILY
Qty: 23 G | Refills: 0 | Status: SHIPPED | OUTPATIENT
Start: 2023-07-20 | End: 2023-09-15

## 2023-07-20 RX ORDER — BROMPHENIRAMINE MALEATE, PSEUDOEPHEDRINE HYDROCHLORIDE, AND DEXTROMETHORPHAN HYDROBROMIDE 2; 30; 10 MG/5ML; MG/5ML; MG/5ML
10 SYRUP ORAL 4 TIMES DAILY PRN
Qty: 118 ML | Refills: 0 | Status: SHIPPED | OUTPATIENT
Start: 2023-07-20 | End: 2023-07-30

## 2023-07-20 RX ORDER — ALBUTEROL SULFATE 90 UG/1
2 AEROSOL, METERED RESPIRATORY (INHALATION) EVERY 4 HOURS PRN
Qty: 18 G | Refills: 0 | Status: SHIPPED | OUTPATIENT
Start: 2023-07-20 | End: 2023-08-19

## 2023-07-20 NOTE — PATIENT INSTRUCTIONS
A CT scan of chest was ordered.  Please go to imaging center to have performed.  You will be notified with results.  Additional orders pending results.

## 2023-07-20 NOTE — PROGRESS NOTES
"Subjective:      Patient ID: Quinton Clayton is a 58 y.o. male.    Vitals:  height is 5' 10.5" (1.791 m) and weight is 128.4 kg (283 lb). His oral temperature is 98.3 °F (36.8 °C). His blood pressure is 138/80 and his pulse is 77. His respiration is 16 and oxygen saturation is 98%.     Chief Complaint: Cough    Mr. Clayton, presents to clinic with a chief complaint of a malingering cough since having COVID-19 approximately 1 month ago.  Patient was seen on 07/08 and treated for upper respiratory infection.  Reports he initially had improvement in symptoms while taking Medrol pack, and then symptoms progressively worsened again.  He reports he is remained afebrile for the duration of illness and denies any other sick contacts.  He reports that his cough is so severe that he feels like he is going to pass out.  Reports phlegm is yellowish and clear.  He is also stating that he just "does not feel right".  He reports he has finish entire bottle of cough syrup, and is requesting a larger bottle to treat his cough.  Medication reconciliation does not show any prescription strength cough suppressant prescribed.  Pt was prescribed Augmentin, tessalon pearls, and medrol dose pack for which he finished treatment.  He denies any past medical history of smoking, asthma, COPD, or lung disease.    Cough  This is a new problem. The current episode started 1 to 4 weeks ago. The problem has been waxing and waning. The problem occurs constantly. The cough is Productive of sputum. Associated symptoms include nasal congestion and postnasal drip. Pertinent negatives include no ear pain, fever, myalgias, rash, sore throat or shortness of breath. He has tried oral steroids and prescription cough suppressant for the symptoms. There is no history of asthma, COPD, emphysema, environmental allergies or pneumonia.     Constitution: Positive for activity change, fatigue and generalized weakness. Negative for fever.   HENT:  Positive for " congestion and postnasal drip. Negative for ear pain and sore throat.    Neck: Negative for painful lymph nodes.   Cardiovascular:  Negative for palpitations.   Eyes:  Negative for eye pain.   Respiratory:  Positive for cough and sputum production (Light green). Negative for shortness of breath and asthma.    Gastrointestinal:  Negative for nausea, vomiting and diarrhea.   Endocrine: cold intolerance and heat intolerance.   Genitourinary:  Negative for dysuria, frequency and urgency.   Musculoskeletal:  Negative for muscle ache.   Skin:  Negative for rash.   Allergic/Immunologic: Negative for environmental allergies and asthma.   Neurological:  Positive for dizziness and light-headedness. Negative for passing out and disorientation.   Hematologic/Lymphatic: Negative for swollen lymph nodes.   Psychiatric/Behavioral:  Negative for disorientation.     Objective:     Physical Exam   Constitutional: He is oriented to person, place, and time. He appears well-developed. He is cooperative.  Non-toxic appearance. He does not appear ill. No distress. obesity  HENT:   Head: Normocephalic and atraumatic.   Ears:   Right Ear: Hearing and external ear normal.   Left Ear: Hearing and external ear normal.   Nose: Nose normal. No mucosal edema or nasal deformity. No epistaxis. Right sinus exhibits no maxillary sinus tenderness and no frontal sinus tenderness. Left sinus exhibits no maxillary sinus tenderness and no frontal sinus tenderness.   Mouth/Throat: Uvula is midline, oropharynx is clear and moist and mucous membranes are normal. Mucous membranes are moist. No trismus in the jaw. Normal dentition. No uvula swelling. No oropharyngeal exudate or posterior oropharyngeal erythema. Oropharynx is clear.   Eyes: Conjunctivae and lids are normal. Pupils are equal, round, and reactive to light. Extraocular movement intact   Neck: Trachea normal and phonation normal. Neck supple.   Cardiovascular: Normal rate, regular rhythm, normal  heart sounds and normal pulses.   Pulmonary/Chest: Effort normal. No tachypnea and no bradypnea. No respiratory distress. He has no rhonchi. He has rales in the right lower field.   Abdominal: Normal appearance.   Musculoskeletal: Normal range of motion.         General: Normal range of motion.      Right lower leg: No edema.      Left lower leg: No edema.   Neurological: no focal deficit. He is alert, oriented to person, place, and time and at baseline. He exhibits normal muscle tone.   Skin: Skin is warm, dry, intact and not diaphoretic. Capillary refill takes 2 to 3 seconds.   Psychiatric: His speech is normal and behavior is normal. Mood, judgment and thought content normal.   Nursing note and vitals reviewed.    Assessment:     1. Subacute cough    2. Chronic rhinitis        Plan:       Subacute cough  -     XR CHEST PA AND LATERAL; Future; Expected date: 07/20/2023  -     CT Chest Without Contrast; Future; Expected date: 07/20/2023  -     albuterol (VENTOLIN HFA) 90 mcg/actuation inhaler; Inhale 2 puffs into the lungs every 4 (four) hours as needed for Wheezing or Shortness of Breath. Rescue  Dispense: 18 g; Refill: 0  -     promethazine-dextromethorphan (PROMETHAZINE-DM) 6.25-15 mg/5 mL Syrp; Take 5 mLs by mouth nightly as needed (night time cough).  Dispense: 50 mL; Refill: 0  -     brompheniramine-pseudoeph-DM (BROMFED DM) 2-30-10 mg/5 mL Syrp; Take 10 mLs by mouth 4 (four) times daily as needed (Cough).  Dispense: 118 mL; Refill: 0    Chronic rhinitis  -     azelastine-fluticasone (DYMISTA) 137-50 mcg/spray Spry nassal spray; 1 spray by Each Nostril route 2 (two) times daily.  Dispense: 23 g; Refill: 0      Narrative & Impression  EXAMINATION:  XR CHEST PA AND LATERAL     CLINICAL HISTORY:  Subacute cough     TECHNIQUE:  PA and lateral views of the chest were performed.     COMPARISON:  None     FINDINGS:  The lungs are clear, with normal appearance of pulmonary vasculature and no pleural effusion or  pneumothorax.     The cardiac silhouette is normal in size. The hilar and mediastinal contours are unremarkable.     Bones are intact.     Impression:     No acute abnormality.        Electronically signed by: Maximo Galan MD  Date:                                            07/20/2023  Time:                                           13:04

## 2023-08-07 ENCOUNTER — HOSPITAL ENCOUNTER (OUTPATIENT)
Dept: RADIOLOGY | Facility: HOSPITAL | Age: 58
Discharge: HOME | End: 2023-08-07
Attending: NURSE PRACTITIONER
Payer: MEDICAID

## 2023-08-07 DIAGNOSIS — J18.9 ATYPICAL PNEUMONIA: Primary | ICD-10-CM

## 2023-08-07 DIAGNOSIS — R05.2 SUBACUTE COUGH: ICD-10-CM

## 2023-08-07 PROCEDURE — 71250 CT THORAX DX C-: CPT | Mod: TC

## 2023-08-07 PROCEDURE — 71250 CT CHEST WITHOUT CONTRAST: ICD-10-PCS | Mod: 26,,, | Performed by: RADIOLOGY

## 2023-08-07 PROCEDURE — 71250 CT THORAX DX C-: CPT | Mod: 26,,, | Performed by: RADIOLOGY

## 2023-08-07 RX ORDER — DOXYCYCLINE 100 MG/1
100 CAPSULE ORAL 2 TIMES DAILY
Qty: 14 CAPSULE | Refills: 0 | Status: SHIPPED | OUTPATIENT
Start: 2023-08-07 | End: 2023-08-14

## 2023-08-11 ENCOUNTER — TELEPHONE (OUTPATIENT)
Dept: URGENT CARE | Facility: CLINIC | Age: 58
End: 2023-08-11
Payer: MEDICAID

## 2023-08-11 NOTE — TELEPHONE ENCOUNTER
I called pt to notify him of CT results, which was right lower lobe atypical pneumonia. I informed pt that Rx for Doxycycline was sent over to pharmacy on file and to make sure he picks up. Pt expressed understanding.

## 2023-08-18 ENCOUNTER — OFFICE VISIT (OUTPATIENT)
Dept: URGENT CARE | Facility: CLINIC | Age: 58
End: 2023-08-18
Payer: MEDICAID

## 2023-08-18 VITALS
RESPIRATION RATE: 20 BRPM | DIASTOLIC BLOOD PRESSURE: 79 MMHG | OXYGEN SATURATION: 96 % | BODY MASS INDEX: 39.34 KG/M2 | SYSTOLIC BLOOD PRESSURE: 136 MMHG | WEIGHT: 281 LBS | HEART RATE: 75 BPM | TEMPERATURE: 98 F | HEIGHT: 71 IN

## 2023-08-18 DIAGNOSIS — U09.9 COVID-19 LONG HAULER MANIFESTING CHRONIC COUGH: Primary | ICD-10-CM

## 2023-08-18 DIAGNOSIS — R05.3 COVID-19 LONG HAULER MANIFESTING CHRONIC COUGH: Primary | ICD-10-CM

## 2023-08-18 PROCEDURE — 99214 OFFICE O/P EST MOD 30 MIN: CPT | Mod: S$GLB,,,

## 2023-08-18 PROCEDURE — 71046 X-RAY EXAM CHEST 2 VIEWS: CPT | Mod: S$GLB,,, | Performed by: RADIOLOGY

## 2023-08-18 PROCEDURE — 71046 XR CHEST PA AND LATERAL: ICD-10-PCS | Mod: S$GLB,,, | Performed by: RADIOLOGY

## 2023-08-18 PROCEDURE — 99214 PR OFFICE/OUTPT VISIT, EST, LEVL IV, 30-39 MIN: ICD-10-PCS | Mod: S$GLB,,,

## 2023-08-18 RX ORDER — PROMETHAZINE HYDROCHLORIDE AND DEXTROMETHORPHAN HYDROBROMIDE 6.25; 15 MG/5ML; MG/5ML
5 SYRUP ORAL EVERY 8 HOURS PRN
Qty: 118 ML | Refills: 0 | Status: SHIPPED | OUTPATIENT
Start: 2023-08-18 | End: 2023-08-26 | Stop reason: SDUPTHER

## 2023-08-18 NOTE — PROGRESS NOTES
"Subjective:      Patient ID: Quinton Clayton is a 58 y.o. male.    Vitals:  height is 5' 10.5" (1.791 m) and weight is 127.5 kg (281 lb). His oral temperature is 97.8 °F (36.6 °C). His blood pressure is 136/79 and his pulse is 75. His respiration is 20 and oxygen saturation is 96%.     Chief Complaint: Cough    Patient was initially diagnosed with COVID on July 8th as well as lower respiratory infection and put on Augmentin and a steroid pack. Several weeks later the patient reported back with complaints continued coughing and subsequently diagnosed with atypical pneumonia and put on doxycycline.  Patient reports he finished all previously prescribed medications and still has a lingering cough that is productive with clear sputum.  Discussed with the patient that after COVID and pneumonia the cough is likely to linger for weeks to months.  Lung sounds clear on exam but offered a chest x-ray compare to previous.  Patient agreeable suggest x-ray in willing to wait for x-ray to arrive in clinic.  Patient requesting some cough medicine to help him sleep at night as his cough is worse in the evening.     Cough  Pertinent negatives include no chest pain, chills, fever, sore throat, shortness of breath or wheezing.       Constitution: Negative for chills, fatigue and fever.   HENT: Negative.  Negative for congestion and sore throat.    Neck: neck negative. Negative for neck pain and neck stiffness.   Cardiovascular: Negative.  Negative for chest pain and sob on exertion.   Eyes: Negative.    Respiratory:  Positive for cough and sputum production (clear). Negative for shortness of breath, stridor and wheezing.    Gastrointestinal: Negative.    Endocrine: negative.   Genitourinary: Negative.    Musculoskeletal: Negative.    Skin: Negative.    Allergic/Immunologic: Negative.    Neurological: Negative.    Hematologic/Lymphatic: Negative.       Objective:     Physical Exam   Constitutional: He is oriented to person, place, and " time. He appears well-developed. He is cooperative.  Non-toxic appearance. He does not appear ill. No distress.   HENT:   Head: Normocephalic and atraumatic.   Ears:   Right Ear: Hearing and external ear normal.   Left Ear: Hearing and external ear normal.   Nose: Nose normal. No mucosal edema, rhinorrhea, nasal deformity or congestion. No epistaxis. Right sinus exhibits no maxillary sinus tenderness and no frontal sinus tenderness. Left sinus exhibits no maxillary sinus tenderness and no frontal sinus tenderness.   Mouth/Throat: Uvula is midline, oropharynx is clear and moist and mucous membranes are normal. No trismus in the jaw. Normal dentition. No uvula swelling. No oropharyngeal exudate, posterior oropharyngeal edema or posterior oropharyngeal erythema.   Eyes: Conjunctivae and lids are normal. No scleral icterus.   Neck: Trachea normal and phonation normal. Neck supple. No edema present. No erythema present. No neck rigidity present.   Cardiovascular: Normal rate, regular rhythm, normal heart sounds and normal pulses.   Pulmonary/Chest: Effort normal and breath sounds normal. No respiratory distress. He has no decreased breath sounds. He has no wheezes. He has no rhonchi. He exhibits no tenderness.   Abdominal: Normal appearance.   Musculoskeletal: Normal range of motion.         General: No deformity. Normal range of motion.   Neurological: He is alert and oriented to person, place, and time. He exhibits normal muscle tone. Coordination normal.   Skin: Skin is warm, dry, intact, not diaphoretic and not pale. Capillary refill takes less than 2 seconds.   Psychiatric: His speech is normal and behavior is normal. Mood, judgment and thought content normal.   Nursing note and vitals reviewed.      Assessment:     1. COVID-19 long hauler manifesting chronic cough      EXAMINATION:  XR CHEST PA AND LATERAL     CLINICAL HISTORY:  Chronic cough     TECHNIQUE:  PA and lateral views of the chest were performed.      COMPARISON:  Chest x-ray of July 20, 2023     FINDINGS:  The lungs are clear, with normal appearance of pulmonary vasculature and no pleural effusion or pneumothorax.     The cardiac silhouette is normal in size. The hilar and mediastinal contours are unremarkable.     Bones are intact.     Impression:     No acute abnormality.        Electronically signed by: Maximo Galan MD  Date:                                            08/18/2023  Time:                                           08:59    Plan:       COVID-19 long hauler manifesting chronic cough  -     XR CHEST PA AND LATERAL; Future; Expected date: 08/18/2023  -     promethazine-dextromethorphan (PROMETHAZINE-DM) 6.25-15 mg/5 mL Syrp; Take 5 mLs by mouth every 8 (eight) hours as needed (Cough).  Dispense: 118 mL; Refill: 0      Discussed x-ray findings with patient as well as his cough may linger for several weeks to several months post COVID and pneumonia.  Discussed medication both over-the-counter and prescribed as well as need to follow up with the primary care provider.

## 2023-08-18 NOTE — PROGRESS NOTES
"Subjective:      Patient ID: Quinton Clayton is a 58 y.o. male.    Vitals:  height is 5' 10.5" (1.791 m) and weight is 127.5 kg (281 lb). His oral temperature is 97.8 °F (36.6 °C). His blood pressure is 136/79 and his pulse is 75. His respiration is 20 and oxygen saturation is 96%.     Chief Complaint: Cough    Cough  This is a recurrent problem. Episode onset: 3 months ago. The problem has been unchanged. The cough is Productive of sputum. Associated symptoms comments: Chest congestion. He has tried prescription cough suppressant for the symptoms. The treatment provided no relief.       Respiratory:  Positive for cough.       Objective:     Physical Exam    Assessment:     No diagnosis found.    Plan:       There are no diagnoses linked to this encounter.                "

## 2023-08-26 ENCOUNTER — OFFICE VISIT (OUTPATIENT)
Dept: URGENT CARE | Facility: CLINIC | Age: 58
End: 2023-08-26
Payer: MEDICAID

## 2023-08-26 VITALS
WEIGHT: 283 LBS | HEART RATE: 72 BPM | DIASTOLIC BLOOD PRESSURE: 91 MMHG | BODY MASS INDEX: 40.52 KG/M2 | OXYGEN SATURATION: 97 % | TEMPERATURE: 98 F | RESPIRATION RATE: 18 BRPM | HEIGHT: 70 IN | SYSTOLIC BLOOD PRESSURE: 150 MMHG

## 2023-08-26 DIAGNOSIS — R05.3 COVID-19 LONG HAULER MANIFESTING CHRONIC COUGH: ICD-10-CM

## 2023-08-26 DIAGNOSIS — J18.9 ATYPICAL PNEUMONIA: Primary | ICD-10-CM

## 2023-08-26 DIAGNOSIS — U09.9 COVID-19 LONG HAULER MANIFESTING CHRONIC COUGH: ICD-10-CM

## 2023-08-26 PROCEDURE — 99214 OFFICE O/P EST MOD 30 MIN: CPT | Mod: S$GLB,,, | Performed by: NURSE PRACTITIONER

## 2023-08-26 PROCEDURE — 99214 PR OFFICE/OUTPT VISIT, EST, LEVL IV, 30-39 MIN: ICD-10-PCS | Mod: S$GLB,,, | Performed by: NURSE PRACTITIONER

## 2023-08-26 RX ORDER — METHYLPREDNISOLONE 4 MG/1
TABLET ORAL
Qty: 1 EACH | Refills: 0 | Status: SHIPPED | OUTPATIENT
Start: 2023-08-26 | End: 2023-09-15

## 2023-08-26 RX ORDER — DOXYCYCLINE HYCLATE 100 MG
100 TABLET ORAL 2 TIMES DAILY
Qty: 20 TABLET | Refills: 0 | Status: SHIPPED | OUTPATIENT
Start: 2023-08-26 | End: 2023-09-15

## 2023-08-26 RX ORDER — PROMETHAZINE HYDROCHLORIDE AND DEXTROMETHORPHAN HYDROBROMIDE 6.25; 15 MG/5ML; MG/5ML
5 SYRUP ORAL EVERY 8 HOURS PRN
Qty: 118 ML | Refills: 0 | Status: SHIPPED | OUTPATIENT
Start: 2023-08-26 | End: 2023-09-05

## 2023-08-26 NOTE — PROGRESS NOTES
"Subjective:      Patient ID: Quinton Clayton is a 58 y.o. male.    Vitals:  height is 5' 10" (1.778 m) and weight is 128.4 kg (283 lb). His temperature is 97.5 °F (36.4 °C). His blood pressure is 150/91 (abnormal) and his pulse is 72. His respiration is 18 and oxygen saturation is 97%.     Chief Complaint: Follow-up (Follow up pneumonia X 3 months )    States he just needs meds to get this out. He can feel himself getting sicker since last visit. Discussed last cxr with him was normal. No further treatment was indicated. He has not seen pulmonology. He does not have a PCP states it takes months to get in. He is unsure of names of medications indicates that they are present in the system.  Refers to some blue pill that was helpful.  States symptoms have worsened in the last 8 days.  He notes a cough and his girlfriend reports a rattle in his chest when he sleeping at night.  Patient is here alone in the room.      Follow-up  This is a chronic problem. The current episode started more than 1 month ago. The problem occurs intermittently. The problem has been gradually worsening. Associated symptoms include congestion and coughing. Pertinent negatives include no chest pain, chills or fever. He has tried acetaminophen and NSAIDs (cough meds) for the symptoms. The treatment provided mild relief.       Constitution: Negative for chills and fever.   HENT:  Positive for congestion.    Cardiovascular:  Negative for chest pain.   Respiratory:  Positive for cough. Negative for chest tightness, sputum production, bloody sputum and shortness of breath.       Objective:     Physical Exam   HENT:   Head: Normocephalic and atraumatic.   Cardiovascular: Normal rate, regular rhythm, normal heart sounds and normal pulses.   Pulmonary/Chest: Effort normal and breath sounds normal.   Abdominal: Normal appearance. Soft. flat abdomen   Neurological: He is alert.   Skin: Skin is warm and dry.   Vitals reviewed.      Assessment:     1. " Atypical pneumonia    2. COVID-19 long hauler manifesting chronic cough        Plan:       Atypical pneumonia  -     Ambulatory referral/consult to Pulmonology    COVID-19 long hauler manifesting chronic cough  -     promethazine-dextromethorphan (PROMETHAZINE-DM) 6.25-15 mg/5 mL Syrp; Take 5 mLs by mouth every 8 (eight) hours as needed (Cough).  Dispense: 118 mL; Refill: 0    Other orders  -     doxycycline (VIBRA-TABS) 100 MG tablet; Take 1 tablet (100 mg total) by mouth 2 (two) times daily.  Dispense: 20 tablet; Refill: 0  -     methylPREDNISolone (MEDROL DOSEPACK) 4 mg tablet; use as directed  Dispense: 1 each; Refill: 0                Discussed with patient abnormal CT of chest with possibility of atypical pneumonia.  Notes relapsing symptoms at this time and ask for additional medicines.  He has not had bronch eval.  Does not have a primary care physician.  Discussed need follow-up with a primary care physician and pulmonology.  Antibiotics Medrol Dosepak cough med as above.  Does not replace need for follow-up with pulmonologist to ensure resolution.  May need additional workup.  In excessive of 30 minutes total time spent for evaluation and management services. Time included elements of the following: time spent preparing to see patient, obtaining and reviewing separately obtained history, exam, evaluation, counseling and education of patient/family member or care giver, documenting in the HMR, independently interpreting results and communication of results, coordination of care ordering medications, tests, or procedures, referral and communication to other health care professionals.

## 2023-09-15 ENCOUNTER — OFFICE VISIT (OUTPATIENT)
Dept: URGENT CARE | Facility: CLINIC | Age: 58
End: 2023-09-15
Payer: MEDICAID

## 2023-09-15 VITALS
HEIGHT: 70 IN | BODY MASS INDEX: 40.8 KG/M2 | SYSTOLIC BLOOD PRESSURE: 139 MMHG | HEART RATE: 79 BPM | OXYGEN SATURATION: 95 % | RESPIRATION RATE: 18 BRPM | TEMPERATURE: 98 F | WEIGHT: 285 LBS | DIASTOLIC BLOOD PRESSURE: 86 MMHG

## 2023-09-15 DIAGNOSIS — U09.9 COVID-19 LONG HAULER: ICD-10-CM

## 2023-09-15 DIAGNOSIS — G47.30 SLEEP APNEA IN ADULT: ICD-10-CM

## 2023-09-15 DIAGNOSIS — K21.9 GASTROESOPHAGEAL REFLUX DISEASE, UNSPECIFIED WHETHER ESOPHAGITIS PRESENT: ICD-10-CM

## 2023-09-15 DIAGNOSIS — R05.2 SUBACUTE COUGH: Primary | ICD-10-CM

## 2023-09-15 PROCEDURE — 71046 XR CHEST PA AND LATERAL: ICD-10-PCS | Mod: S$GLB,,, | Performed by: RADIOLOGY

## 2023-09-15 PROCEDURE — 99214 PR OFFICE/OUTPT VISIT, EST, LEVL IV, 30-39 MIN: ICD-10-PCS | Mod: S$GLB,,,

## 2023-09-15 PROCEDURE — 99214 OFFICE O/P EST MOD 30 MIN: CPT | Mod: S$GLB,,,

## 2023-09-15 PROCEDURE — 71046 X-RAY EXAM CHEST 2 VIEWS: CPT | Mod: S$GLB,,, | Performed by: RADIOLOGY

## 2023-09-15 RX ORDER — PANTOPRAZOLE SODIUM 40 MG/1
40 TABLET, DELAYED RELEASE ORAL DAILY
Qty: 30 TABLET | Refills: 11 | Status: SHIPPED | OUTPATIENT
Start: 2023-09-15 | End: 2024-09-14

## 2023-09-15 NOTE — PROGRESS NOTES
"Subjective:      Patient ID: Quinton Clayton is a 58 y.o. male.    Vitals:  height is 5' 10" (1.778 m) and weight is 129.3 kg (285 lb). His temperature is 97.6 °F (36.4 °C). His blood pressure is 139/86 and his pulse is 79. His respiration is 18 and oxygen saturation is 95%.     Chief Complaint: Cough    Mr. Clayton, past medical history of GERD, and hypertension, presents to clinic for recurrent cough.  Patient was treated for atypical pneumonia over 1 month ago.  He states he initially improved and, then symptoms returned proximally 2 days ago.  He is reporting a cough with productive yellow phlegm.  He denies any shortness of breath, fevers, nasal congestion, sore throat, or paced nasal drip.  He denies any sick contacts within the household.  He denies any history of smoking.  He has trace pitting lower edema.  Patient also also recently had COVID-19      Cough  This is a chronic problem. The current episode started more than 1 month ago. The problem has been gradually worsening. The problem occurs constantly. The cough is Productive of purulent sputum. Associated symptoms include heartburn. Pertinent negatives include no chills, ear congestion, ear pain, fever, headaches, nasal congestion, postnasal drip, rash, sore throat or shortness of breath. Nothing aggravates the symptoms. He has tried OTC cough suppressant for the symptoms. The treatment provided mild relief. His past medical history is significant for pneumonia. There is no history of environmental allergies.       Constitution: Negative for chills and fever.   HENT:  Negative for ear pain, congestion, postnasal drip and sore throat.    Neck: Negative for painful lymph nodes.   Cardiovascular:  Positive for leg swelling. Negative for palpitations.   Eyes:  Negative for blurred vision.   Respiratory:  Positive for cough and sputum production. Negative for shortness of breath.    Gastrointestinal:  Positive for heartburn. Negative for nausea, vomiting and " diarrhea.   Endocrine: cold intolerance and heat intolerance.   Genitourinary:  Negative for dysuria, frequency and urgency.   Musculoskeletal:  Negative for pain.   Skin:  Negative for rash.   Allergic/Immunologic: Negative for environmental allergies and seasonal allergies.   Neurological:  Negative for headaches and altered mental status.   Hematologic/Lymphatic: Negative for swollen lymph nodes.   Psychiatric/Behavioral:  Negative for altered mental status.       Objective:     Physical Exam   Constitutional: He is oriented to person, place, and time. He appears well-developed. He is cooperative.  Non-toxic appearance. He does not appear ill. No distress. obesity  HENT:   Head: Normocephalic and atraumatic.   Ears:   Right Ear: Hearing and external ear normal.   Left Ear: Hearing and external ear normal.   Nose: Nose normal. No mucosal edema or nasal deformity. No epistaxis. Right sinus exhibits no maxillary sinus tenderness and no frontal sinus tenderness. Left sinus exhibits no maxillary sinus tenderness and no frontal sinus tenderness.   Mouth/Throat: Uvula is midline, oropharynx is clear and moist and mucous membranes are normal. Mucous membranes are moist. No trismus in the jaw. Normal dentition. No uvula swelling. No oropharyngeal exudate or posterior oropharyngeal erythema. Oropharynx is clear.   Eyes: Conjunctivae and lids are normal. Pupils are equal, round, and reactive to light. Extraocular movement intact   Neck: Trachea normal and phonation normal. Neck supple.   Cardiovascular: Normal rate, regular rhythm, normal heart sounds and normal pulses.   Pulmonary/Chest: Effort normal and breath sounds normal. No stridor. No respiratory distress. He has no wheezes. He has no rhonchi. He has no rales. He exhibits no tenderness.   Abdominal: Normal appearance. Soft. flat abdomen There is no abdominal tenderness. There is no left CVA tenderness and no right CVA tenderness.   Musculoskeletal: Normal range of  motion.         General: Normal range of motion.      Right lower leg: Pitting Edema present.      Left lower leg: Pitting Edema present.   Neurological: no focal deficit. He is alert, oriented to person, place, and time and at baseline. He exhibits normal muscle tone.   Skin: Skin is warm, dry, intact and not diaphoretic. Capillary refill takes 2 to 3 seconds.   Psychiatric: His speech is normal and behavior is normal. Mood, judgment and thought content normal.   Nursing note and vitals reviewed.      Assessment:     1. Subacute cough    2. Sleep apnea in adult    3. Gastroesophageal reflux disease, unspecified whether esophagitis present    4. COVID-19 long hauler        Plan:       Subacute cough  -     XR CHEST PA AND LATERAL; Future; Expected date: 09/15/2023  -     Ambulatory referral/consult to Pulmonology  -     External Referral to [facility]  -     tiotropium bromide (SPIRIVA RESPIMAT) 1.25 mcg/actuation inhaler; Inhale 2 puffs into the lungs once daily. Controller  Dispense: 4 g; Refill: 0  -     Ambulatory referral/consult to Gastroenterology    Sleep apnea in adult  -     Home Sleep Study; Future  -     External Referral to [facility]    Gastroesophageal reflux disease, unspecified whether esophagitis present  -     pantoprazole (PROTONIX) 40 MG tablet; Take 1 tablet (40 mg total) by mouth once daily.  Dispense: 30 tablet; Refill: 11  -     Ambulatory referral/consult to Gastroenterology    COVID-19 long hauler      Reinforced and Discussed importance for needing a thorough examination and workup for evaluation of cough.  Workup also previously discussed at last visit.  Patient refused.        Impression:     Negative chest

## 2023-09-20 RX ORDER — ALBUTEROL SULFATE 90 UG/1
2 AEROSOL, METERED RESPIRATORY (INHALATION) EVERY 6 HOURS PRN
Qty: 18 G | Refills: 0 | Status: SHIPPED | OUTPATIENT
Start: 2023-09-20

## 2023-09-20 RX ORDER — IPRATROPIUM BROMIDE AND ALBUTEROL SULFATE 2.5; .5 MG/3ML; MG/3ML
3 SOLUTION RESPIRATORY (INHALATION) EVERY 6 HOURS PRN
Qty: 75 ML | Refills: 0 | Status: SHIPPED | OUTPATIENT
Start: 2023-09-20

## 2023-11-13 ENCOUNTER — PROCEDURE VISIT (OUTPATIENT)
Dept: SLEEP MEDICINE | Facility: HOSPITAL | Age: 58
End: 2023-11-13
Payer: MEDICAID

## 2023-11-13 DIAGNOSIS — G47.30 SLEEP APNEA IN ADULT: ICD-10-CM

## 2023-11-13 PROCEDURE — 95806 SLEEP STUDY UNATT&RESP EFFT: CPT

## 2023-11-16 DIAGNOSIS — G47.33 OSA (OBSTRUCTIVE SLEEP APNEA): Primary | ICD-10-CM

## 2023-12-16 ENCOUNTER — OFFICE VISIT (OUTPATIENT)
Dept: URGENT CARE | Facility: CLINIC | Age: 58
End: 2023-12-16
Payer: MEDICAID

## 2023-12-16 VITALS
OXYGEN SATURATION: 95 % | TEMPERATURE: 98 F | SYSTOLIC BLOOD PRESSURE: 148 MMHG | BODY MASS INDEX: 40.06 KG/M2 | HEART RATE: 77 BPM | WEIGHT: 286.19 LBS | HEIGHT: 71 IN | DIASTOLIC BLOOD PRESSURE: 89 MMHG | RESPIRATION RATE: 20 BRPM

## 2023-12-16 DIAGNOSIS — J02.0 STREP THROAT: ICD-10-CM

## 2023-12-16 DIAGNOSIS — L03.114 CELLULITIS OF LEFT UPPER EXTREMITY: ICD-10-CM

## 2023-12-16 DIAGNOSIS — J02.9 SORE THROAT: Primary | ICD-10-CM

## 2023-12-16 LAB
CTP QC/QA: YES
FLUAV AG NPH QL: NEGATIVE
FLUBV AG NPH QL: NEGATIVE
S PYO RRNA THROAT QL PROBE: POSITIVE
SARS-COV-2 AG RESP QL IA.RAPID: NEGATIVE

## 2023-12-16 PROCEDURE — 87804 INFLUENZA ASSAY W/OPTIC: CPT | Mod: 59,QW,, | Performed by: NURSE PRACTITIONER

## 2023-12-16 PROCEDURE — 87880 POCT RAPID STREP A: ICD-10-PCS | Mod: QW,,, | Performed by: NURSE PRACTITIONER

## 2023-12-16 PROCEDURE — 99214 PR OFFICE/OUTPT VISIT, EST, LEVL IV, 30-39 MIN: ICD-10-PCS | Mod: S$GLB,,, | Performed by: NURSE PRACTITIONER

## 2023-12-16 PROCEDURE — 99214 OFFICE O/P EST MOD 30 MIN: CPT | Mod: S$GLB,,, | Performed by: NURSE PRACTITIONER

## 2023-12-16 PROCEDURE — 87811 SARS CORONAVIRUS 2 ANTIGEN POCT, MANUAL READ: ICD-10-PCS | Mod: QW,S$GLB,, | Performed by: NURSE PRACTITIONER

## 2023-12-16 PROCEDURE — 87804 POCT INFLUENZA A/B: ICD-10-PCS | Mod: 59,QW,, | Performed by: NURSE PRACTITIONER

## 2023-12-16 PROCEDURE — 87880 STREP A ASSAY W/OPTIC: CPT | Mod: QW,,, | Performed by: NURSE PRACTITIONER

## 2023-12-16 PROCEDURE — 87811 SARS-COV-2 COVID19 W/OPTIC: CPT | Mod: QW,S$GLB,, | Performed by: NURSE PRACTITIONER

## 2023-12-16 RX ORDER — AMOXICILLIN AND CLAVULANATE POTASSIUM 875; 125 MG/1; MG/1
1 TABLET, FILM COATED ORAL EVERY 12 HOURS
Qty: 20 TABLET | Refills: 0 | Status: SHIPPED | OUTPATIENT
Start: 2023-12-16 | End: 2023-12-26

## 2023-12-16 RX ORDER — MUPIROCIN 20 MG/G
OINTMENT TOPICAL 3 TIMES DAILY
Qty: 22 G | Refills: 1 | Status: SHIPPED | OUTPATIENT
Start: 2023-12-16 | End: 2023-12-23

## 2023-12-16 RX ORDER — PROMETHAZINE HYDROCHLORIDE AND DEXTROMETHORPHAN HYDROBROMIDE 6.25; 15 MG/5ML; MG/5ML
5 SYRUP ORAL EVERY 6 HOURS PRN
Qty: 120 ML | Refills: 0 | Status: SHIPPED | OUTPATIENT
Start: 2023-12-16 | End: 2023-12-23

## 2023-12-16 NOTE — PATIENT INSTRUCTIONS
Claritin(loratadine), Allegra(fexofenadine), or zyrtec(cetirizine) for runny nose and congestion.   Mucinex DM for day time cough.   Saline nasal spray 4 times a day each nostril.     Complete antibiotic and use ointment on arm for 10 days. If symptoms are not improving follow up with dermatology for biopsy.

## 2023-12-16 NOTE — PROGRESS NOTES
"Subjective:      Patient ID: Quinton Clayton is a 58 y.o. male.    Vitals:  height is 5' 11" (1.803 m) and weight is 129.8 kg (286 lb 3.2 oz). His temperature is 97.5 °F (36.4 °C). His blood pressure is 148/89 (abnormal) and his pulse is 77. His respiration is 20 and oxygen saturation is 95%.     Chief Complaint: Sore Throat    Pt states" c/o sore throat, sinus pressure, HA, nasal congestion that has been going on for 2 days. Took night quil."   Notes injury to left forearm and then scratched. Sore and draining now.         Constitution: Negative for chills and fever.   HENT:  Negative for sinus pressure.    Cardiovascular:  Negative for chest pain.   Respiratory:  Positive for cough. Negative for shortness of breath.       Objective:     Physical Exam   HENT:   Head: Normocephalic and atraumatic.   Ears:   Right Ear: Tympanic membrane normal.   Left Ear: Tympanic membrane normal.   Nose: Nose normal.   Mouth/Throat: Mucous membranes are moist.   Eyes: Conjunctivae are normal. Pupils are equal, round, and reactive to light. Extraocular movement intact   Neck: Neck supple.   Cardiovascular: Normal rate, regular rhythm, normal heart sounds and normal pulses.   Pulmonary/Chest: Effort normal and breath sounds normal.   Abdominal: Normal appearance.   Neurological: He is alert.   Vitals reviewed.      Assessment:     1. Sore throat    2. Strep throat    3. Cellulitis of left upper extremity        Plan:       Sore throat  -     POCT Influenza A/B Rapid Antigen  -     POCT rapid strep A  -     SARS Coronavirus 2 Antigen, POCT Manual Read    Strep throat  -     SARS Coronavirus 2 Antigen, POCT Manual Read    Cellulitis of left upper extremity    Other orders  -     amoxicillin-clavulanate 875-125mg (AUGMENTIN) 875-125 mg per tablet; Take 1 tablet by mouth every 12 (twelve) hours. for 10 days  Dispense: 20 tablet; Refill: 0  -     promethazine-dextromethorphan (PROMETHAZINE-DM) 6.25-15 mg/5 mL Syrp; Take 5 mLs by mouth " every 6 (six) hours as needed (cough).  Dispense: 120 mL; Refill: 0  -     mupirocin (BACTROBAN) 2 % ointment; Apply topically 3 (three) times daily. Left fore arm for 7 days  Dispense: 22 g; Refill: 1                  Strep positive.   Rx as above  Sore on arm. Treat for cellulitis.   Patient Instructions   Claritin(loratadine), Allegra(fexofenadine), or zyrtec(cetirizine) for runny nose and congestion.   Mucinex DM for day time cough.   Saline nasal spray 4 times a day each nostril.     Complete antibiotic and use ointment on arm for 10 days. If symptoms are not improving follow up with dermatology for biopsy.

## 2023-12-19 ENCOUNTER — PROCEDURE VISIT (OUTPATIENT)
Dept: SLEEP MEDICINE | Facility: HOSPITAL | Age: 58
End: 2023-12-19
Payer: MEDICAID

## 2023-12-19 DIAGNOSIS — G47.33 OSA (OBSTRUCTIVE SLEEP APNEA): ICD-10-CM

## 2023-12-19 PROCEDURE — 95811 POLYSOM 6/>YRS CPAP 4/> PARM: CPT

## 2024-01-19 DIAGNOSIS — G47.33 OSA (OBSTRUCTIVE SLEEP APNEA): Primary | ICD-10-CM

## 2024-07-09 ENCOUNTER — OFFICE VISIT (OUTPATIENT)
Dept: URGENT CARE | Facility: CLINIC | Age: 59
End: 2024-07-09
Payer: MEDICAID

## 2024-07-09 VITALS
HEIGHT: 67 IN | HEART RATE: 90 BPM | BODY MASS INDEX: 43.32 KG/M2 | OXYGEN SATURATION: 98 % | DIASTOLIC BLOOD PRESSURE: 82 MMHG | TEMPERATURE: 98 F | SYSTOLIC BLOOD PRESSURE: 150 MMHG | RESPIRATION RATE: 18 BRPM | WEIGHT: 276 LBS

## 2024-07-09 DIAGNOSIS — K21.9 GASTROESOPHAGEAL REFLUX DISEASE, UNSPECIFIED WHETHER ESOPHAGITIS PRESENT: ICD-10-CM
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
## 2024-07-09 DIAGNOSIS — Z86.79 HISTORY OF HYPERTENSION: ICD-10-CM

## 2024-07-09 DIAGNOSIS — Z76.89 ENCOUNTER TO ESTABLISH CARE: ICD-10-CM

## 2024-07-09 DIAGNOSIS — Z76.0 ENCOUNTER FOR MEDICATION REFILL: ICD-10-CM

## 2024-07-09 DIAGNOSIS — S43.401A SPRAIN OF RIGHT SHOULDER, UNSPECIFIED SHOULDER SPRAIN TYPE, INITIAL ENCOUNTER: ICD-10-CM

## 2024-07-09 DIAGNOSIS — S49.91XA INJURY OF RIGHT SHOULDER, INITIAL ENCOUNTER: Primary | ICD-10-CM

## 2024-07-09 PROBLEM — I10 ESSENTIAL HYPERTENSION: Status: ACTIVE | Noted: 2018-08-20

## 2024-07-09 PROCEDURE — 73030 X-RAY EXAM OF SHOULDER: CPT | Mod: RT,S$GLB,, | Performed by: RADIOLOGY

## 2024-07-09 PROCEDURE — 99214 OFFICE O/P EST MOD 30 MIN: CPT | Mod: S$GLB,,, | Performed by: NURSE PRACTITIONER

## 2024-07-09 RX ORDER — AMLODIPINE BESYLATE 10 MG/1
10 TABLET ORAL DAILY
Qty: 90 TABLET | Refills: 0 | Status: SHIPPED | OUTPATIENT
Start: 2024-07-09

## 2024-07-09 RX ORDER — METHOCARBAMOL 500 MG/1
500 TABLET, FILM COATED ORAL 3 TIMES DAILY PRN
Qty: 60 TABLET | Refills: 0 | Status: SHIPPED | OUTPATIENT
Start: 2024-07-09

## 2024-07-09 RX ORDER — HYDROCODONE BITARTRATE AND ACETAMINOPHEN 5; 325 MG/1; MG/1
1 TABLET ORAL EVERY 6 HOURS PRN
Qty: 12 TABLET | Refills: 0 | Status: SHIPPED | OUTPATIENT
Start: 2024-07-09

## 2024-07-09 RX ORDER — PANTOPRAZOLE SODIUM 40 MG/1
40 TABLET, DELAYED RELEASE ORAL DAILY
Qty: 90 TABLET | Refills: 0 | Status: SHIPPED | OUTPATIENT
Start: 2024-07-09

## 2024-07-09 RX ORDER — DICLOFENAC SODIUM 75 MG/1
75 TABLET, DELAYED RELEASE ORAL 2 TIMES DAILY
Qty: 28 TABLET | Refills: 0 | Status: SHIPPED | OUTPATIENT
Start: 2024-07-09 | End: 2024-07-23

## 2024-07-09 NOTE — PROGRESS NOTES
"Subjective:      Patient ID: Quinton Clayton is a 59 y.o. male.    Vitals:  height is 5' 7" (1.702 m) and weight is 125.2 kg (276 lb). His oral temperature is 97.5 °F (36.4 °C). His blood pressure is 150/82 (abnormal) and his pulse is 90. His respiration is 18 and oxygen saturation is 98%.     Chief Complaint: Shoulder Injury (RT. TRIP AND FELL YESTERDAY ONTO CONCRETE, NO LOC) and Medication Refill (RUNNING LOW ON AMLODIPINE AND PROTONIX)    Shoulder Injury   The incident occurred at home. The right shoulder is affected. The incident occurred 12 to 24 hours ago. The injury mechanism was a fall. The pain is at a severity of 9/10. The pain is moderate. Pertinent negatives include no chest pain or numbness. The symptoms are aggravated by movement. He has tried nothing for the symptoms. The treatment provided no relief.   Medication Refill  Associated symptoms include arthralgias. Pertinent negatives include no chest pain, headaches, joint swelling, neck pain or numbness.     Neck: Negative for neck pain.   Cardiovascular:  Negative for chest trauma and chest pain.   Gastrointestinal:  Negative for abdominal trauma.   Musculoskeletal:  Positive for trauma and joint pain. Negative for joint swelling, abnormal ROM of joint and back pain.   Skin:  Negative for wound, erythema and bruising.   Neurological:  Negative for dizziness, headaches, numbness and tingling.      Objective:     Physical Exam   Constitutional: He is oriented to person, place, and time.  Non-toxic appearance. He does not appear ill. No distress.   HENT:   Head: Normocephalic and atraumatic.   Nose: Nose normal.   Mouth/Throat: Mucous membranes are moist.   Eyes: Conjunctivae are normal.   Cardiovascular: Normal rate.   Pulmonary/Chest: Effort normal. No respiratory distress.   Abdominal: Normal appearance.   Musculoskeletal: Normal range of motion.         General: Tenderness present. No swelling, deformity or signs of injury. Normal range of motion.     "  Right shoulder: He exhibits tenderness (generlized). He exhibits normal range of motion, no bony tenderness, no swelling and no deformity.      Right elbow: Normal.     Right upper arm: Normal.   Neurological: no focal deficit. He is alert and oriented to person, place, and time.   Skin: Skin is warm, dry and no rash. Capillary refill takes 2 to 3 seconds. No bruising, No erythema and No lesion   Psychiatric: His behavior is normal. Mood normal.   Nursing note and vitals reviewed.      Assessment:     1. Injury of right shoulder, initial encounter    2. History of hypertension    3. Gastroesophageal reflux disease, unspecified whether esophagitis present    4. Encounter for medication refill    5. Encounter to establish care    6. Fall, initial encounter    7. Sprain of right shoulder, unspecified shoulder sprain type, initial encounter      Right shoulder x-ray:     FINDINGS:  No fracture or dislocation.  No significant degenerative changes.     Impression:     No acute osseous abnormality.    Sling/immobilizer placed right arm in clinic  Plan:       Injury of right shoulder, initial encounter  -     X-Ray Shoulder Trauma 3 view Right; Future; Expected date: 07/09/2024    History of hypertension  -     amLODIPine (NORVASC) 10 MG tablet; Take 1 tablet (10 mg total) by mouth once daily.  Dispense: 90 tablet; Refill: 0  -     External Referral to [facility]    Gastroesophageal reflux disease, unspecified whether esophagitis present  -     pantoprazole (PROTONIX) 40 MG tablet; Take 1 tablet (40 mg total) by mouth once daily.  Dispense: 90 tablet; Refill: 0  -     External Referral to [facility]    Encounter for medication refill  -     amLODIPine (NORVASC) 10 MG tablet; Take 1 tablet (10 mg total) by mouth once daily.  Dispense: 90 tablet; Refill: 0  -     pantoprazole (PROTONIX) 40 MG tablet; Take 1 tablet (40 mg total) by mouth once daily.  Dispense: 90 tablet; Refill: 0  -     External Referral to [facility]    Encounter to establish care  -     External Referral to [facility]    Fall, initial encounter    Sprain of right shoulder, unspecified shoulder sprain type, initial encounter  -     HME - OTHER  -     diclofenac (VOLTAREN) 75 MG EC tablet; Take 1 tablet (75 mg total) by mouth 2 (two) times daily. for 14 days  Dispense: 28 tablet; Refill: 0  -     methocarbamoL (ROBAXIN) 500 MG Tab; Take 1 tablet (500 mg total) by mouth 3 (three) times daily as needed (Muscle spasms).  Dispense: 60 tablet; Refill: 0  -     HYDROcodone-acetaminophen (NORCO) 5-325 mg per tablet; Take 1 tablet by mouth every 6 (six) hours as needed for Pain.  Dispense: 12 tablet; Refill: 0

## 2024-07-09 NOTE — PATIENT INSTRUCTIONS
Diclofenac twice a day for 14 days.  Always take with food.  Do not take any other anti-inflammatories or NSAIDs such as ibuprofen or Naprosyn over-the-counter when taking diclofenac  Robaxin as prescribed as needed for muscle spasms  Norco for breakthrough pain not relieved by medications above.  Do not drive or operate heavy machinery while taking medications  Ice to the affected area  Sling to be worn when up and ambulatory for shoulder immobilization until symptoms have significantly improved  Limit activity right arm not to aggravate symptoms  Referral was placed to [facility] to establish care with a primary care provider and follow-up for shoulder injury

## 2024-07-23 ENCOUNTER — OFFICE VISIT (OUTPATIENT)
Dept: URGENT CARE | Facility: CLINIC | Age: 59
End: 2024-07-23
Payer: MEDICAID

## 2024-07-23 VITALS
BODY MASS INDEX: 43.32 KG/M2 | HEIGHT: 67 IN | DIASTOLIC BLOOD PRESSURE: 90 MMHG | WEIGHT: 276 LBS | RESPIRATION RATE: 16 BRPM | TEMPERATURE: 99 F | OXYGEN SATURATION: 95 % | HEART RATE: 77 BPM | SYSTOLIC BLOOD PRESSURE: 153 MMHG

## 2024-07-23 DIAGNOSIS — M25.511 ACUTE PAIN OF RIGHT SHOULDER: Primary | ICD-10-CM
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
## 2024-07-23 PROCEDURE — 99213 OFFICE O/P EST LOW 20 MIN: CPT | Mod: S$GLB,,, | Performed by: NURSE PRACTITIONER

## 2024-07-23 RX ORDER — CYCLOBENZAPRINE HCL 10 MG
10 TABLET ORAL NIGHTLY PRN
Qty: 7 TABLET | Refills: 0 | Status: SHIPPED | OUTPATIENT
Start: 2024-07-23 | End: 2024-07-30

## 2024-07-23 RX ORDER — NAPROXEN 500 MG/1
500 TABLET ORAL 2 TIMES DAILY PRN
Qty: 14 TABLET | Refills: 0 | Status: SHIPPED | OUTPATIENT
Start: 2024-07-23 | End: 2024-07-30

## 2024-07-23 NOTE — PATIENT INSTRUCTIONS
Increase clear fluid intake  May moist heat or heating pad to affected area for 15 minutes every 2 hours.  Take care not to fall sleep on heating pad as this may cause severe burns  May use sling to rest shoulder joint as needed.  Take naproxen as directed.  Take each dose with a full meal.  Do not take any additional NSAIDs while taking naproxen.  You may take additional Tylenol as needed between doses    You may take cyclobenzaprine at bedtime for pain and muscle tightness.  Be aware that this medication is sedating.  Take safety precautions as necessary  Follow-up with primary care provider and orthopedic referral   Return to clinic for new or worse symptoms

## 2024-08-05 ENCOUNTER — HOSPITAL ENCOUNTER (EMERGENCY)
Facility: HOSPITAL | Age: 59
Discharge: HOME | End: 2024-08-05
Attending: EMERGENCY MEDICINE
Payer: MEDICAID

## 2024-08-05 VITALS
OXYGEN SATURATION: 95 % | WEIGHT: 276 LBS | HEIGHT: 67 IN | TEMPERATURE: 98 F | HEART RATE: 71 BPM | SYSTOLIC BLOOD PRESSURE: 154 MMHG | RESPIRATION RATE: 20 BRPM | BODY MASS INDEX: 43.32 KG/M2 | DIASTOLIC BLOOD PRESSURE: 88 MMHG

## 2024-08-05 DIAGNOSIS — S43.401A SPRAIN OF RIGHT SHOULDER, UNSPECIFIED SHOULDER SPRAIN TYPE, INITIAL ENCOUNTER: Primary | ICD-10-CM

## 2024-08-05 PROCEDURE — 99283 EMERGENCY DEPT VISIT LOW MDM: CPT

## 2024-08-05 RX ORDER — DICLOFENAC SODIUM 50 MG/1
50 TABLET, DELAYED RELEASE ORAL 3 TIMES DAILY PRN
Qty: 15 TABLET | Refills: 0 | Status: SHIPPED | OUTPATIENT
Start: 2024-08-05 | End: 2024-08-05

## 2024-08-05 RX ORDER — DICLOFENAC SODIUM 50 MG/1
50 TABLET, DELAYED RELEASE ORAL 3 TIMES DAILY PRN
Qty: 15 TABLET | Refills: 0 | Status: SHIPPED | OUTPATIENT
Start: 2024-08-05 | End: 2025-08-05

## 2024-08-09 ENCOUNTER — HOSPITAL ENCOUNTER (OUTPATIENT)
Dept: RADIOLOGY | Facility: HOSPITAL | Age: 59
Discharge: HOME | End: 2024-08-09
Attending: EMERGENCY MEDICINE
Payer: MEDICAID

## 2024-08-09 DIAGNOSIS — S43.401A SPRAIN OF RIGHT SHOULDER, UNSPECIFIED SHOULDER SPRAIN TYPE, INITIAL ENCOUNTER: ICD-10-CM

## 2024-08-14 ENCOUNTER — HOSPITAL ENCOUNTER (OUTPATIENT)
Dept: RADIOLOGY | Facility: HOSPITAL | Age: 59
Discharge: HOME | End: 2024-08-14
Attending: EMERGENCY MEDICINE
Payer: MEDICAID

## 2024-08-14 PROCEDURE — 73221 MRI JOINT UPR EXTREM W/O DYE: CPT | Mod: TC,RT

## 2024-08-14 PROCEDURE — 73221 MRI JOINT UPR EXTREM W/O DYE: CPT | Mod: 26,RT,, | Performed by: RADIOLOGY

## 2024-09-24 ENCOUNTER — OFFICE VISIT (OUTPATIENT)
Dept: URGENT CARE | Facility: CLINIC | Age: 59
End: 2024-09-24
Payer: MEDICAID

## 2024-09-24 VITALS
DIASTOLIC BLOOD PRESSURE: 88 MMHG | TEMPERATURE: 97 F | HEART RATE: 69 BPM | SYSTOLIC BLOOD PRESSURE: 151 MMHG | OXYGEN SATURATION: 96 % | RESPIRATION RATE: 18 BRPM

## 2024-09-24 DIAGNOSIS — G89.29 CHRONIC RIGHT SHOULDER PAIN: Primary | ICD-10-CM

## 2024-09-24 DIAGNOSIS — J01.10 ACUTE NON-RECURRENT FRONTAL SINUSITIS: ICD-10-CM

## 2024-09-24 DIAGNOSIS — Z86.79 HISTORY OF HYPERTENSION: ICD-10-CM

## 2024-09-24 DIAGNOSIS — M25.511 CHRONIC RIGHT SHOULDER PAIN: Primary | ICD-10-CM

## 2024-09-24 DIAGNOSIS — K21.9 GASTROESOPHAGEAL REFLUX DISEASE, UNSPECIFIED WHETHER ESOPHAGITIS PRESENT: ICD-10-CM

## 2024-09-24 DIAGNOSIS — Z76.0 ENCOUNTER FOR MEDICATION REFILL: ICD-10-CM

## 2024-09-24 PROCEDURE — 99214 OFFICE O/P EST MOD 30 MIN: CPT | Mod: S$GLB,,, | Performed by: STUDENT IN AN ORGANIZED HEALTH CARE EDUCATION/TRAINING PROGRAM

## 2024-09-24 RX ORDER — AMOXICILLIN AND CLAVULANATE POTASSIUM 875; 125 MG/1; MG/1
1 TABLET, FILM COATED ORAL EVERY 12 HOURS
Qty: 20 TABLET | Refills: 0 | Status: SHIPPED | OUTPATIENT
Start: 2024-09-24 | End: 2024-10-04

## 2024-09-24 RX ORDER — FLUTICASONE PROPIONATE 50 MCG
2 SPRAY, SUSPENSION (ML) NASAL DAILY
Qty: 15.8 ML | Refills: 0 | Status: SHIPPED | OUTPATIENT
Start: 2024-09-24

## 2024-09-24 RX ORDER — NAPROXEN 500 MG/1
500 TABLET ORAL 2 TIMES DAILY WITH MEALS
Qty: 20 TABLET | Refills: 0 | Status: SHIPPED | OUTPATIENT
Start: 2024-09-24 | End: 2024-10-04

## 2024-09-24 RX ORDER — PANTOPRAZOLE SODIUM 40 MG/1
40 TABLET, DELAYED RELEASE ORAL DAILY
Qty: 30 TABLET | Refills: 0 | Status: SHIPPED | OUTPATIENT
Start: 2024-09-24

## 2024-09-24 RX ORDER — AMLODIPINE BESYLATE 10 MG/1
10 TABLET ORAL DAILY
Qty: 30 TABLET | Refills: 0 | Status: SHIPPED | OUTPATIENT
Start: 2024-09-24

## 2024-09-24 RX ORDER — METHYLPREDNISOLONE 4 MG/1
TABLET ORAL
Qty: 21 EACH | Refills: 0 | Status: SHIPPED | OUTPATIENT
Start: 2024-09-24 | End: 2024-10-15

## 2024-09-24 NOTE — PROGRESS NOTES
Subjective:      Patient ID: Quinton Clayton is a 59 y.o. male.    Vitals:  temperature is 97 °F (36.1 °C). His blood pressure is 151/88 (abnormal) and his pulse is 69. His respiration is 18 and oxygen saturation is 96%.     Chief Complaint: URI    Patient is a 59-year-old male with a past medical history of GERD and hypertension who presents to clinic for evaluation of multiple complaints.  Patient reports the 1st is that URI and sinus related issues.  Patient reports symptoms x2 days.  Patient reports he is vaccinated.  Patient reports no recent or known sick exposures.  Patient reports no over-the-counter medications for symptoms at this point.  Patient states wants to get ahead of symptoms.  Patient states the 2nd complaint is that of chronic right shoulder pain.  Patient states seeing Orthopedics.  Patient states had an MRI.  Patient states would possibility like something to help symptoms until he gets in for an appointment.    Patient also requesting refill on amlodipine and Protonix.  Patient states does not have local PCP.      Constitution: Negative. Negative for chills, sweating, fatigue and fever.   HENT:  Positive for congestion, postnasal drip and sinus pressure. Negative for ear pain and sore throat.    Neck: neck negative.   Cardiovascular: Negative.  Negative for chest pain and palpitations.   Eyes: Negative.    Respiratory:  Positive for cough and sputum production. Negative for chest tightness and shortness of breath.    Gastrointestinal: Negative.  Negative for abdominal pain, nausea, vomiting and diarrhea.   Endocrine: negative.   Genitourinary: Negative.    Musculoskeletal:  Positive for joint pain (Right shoulder pain, chronic, seeing orthopedics). Negative for muscle ache.   Skin: Negative.  Negative for color change, pale, rash and erythema.   Allergic/Immunologic: Negative.    Neurological: Negative.  Negative for dizziness, light-headedness, passing out, headaches, disorientation and  altered mental status.   Hematologic/Lymphatic: Negative.    Psychiatric/Behavioral: Negative.  Negative for altered mental status, disorientation and confusion.       Objective:     Physical Exam   Constitutional: He is oriented to person, place, and time. He appears well-developed. He is cooperative.  Non-toxic appearance. He does not appear ill. No distress. obesity  HENT:   Head: Normocephalic and atraumatic.   Ears:   Right Ear: Hearing, tympanic membrane, external ear and ear canal normal.   Left Ear: Hearing, tympanic membrane, external ear and ear canal normal.   Nose: Congestion present. No mucosal edema, rhinorrhea or nasal deformity. No epistaxis. Right sinus exhibits frontal sinus tenderness. Right sinus exhibits no maxillary sinus tenderness. Left sinus exhibits frontal sinus tenderness. Left sinus exhibits no maxillary sinus tenderness.   Mouth/Throat: Uvula is midline and mucous membranes are normal. Mucous membranes are moist. No trismus in the jaw. Normal dentition. No uvula swelling. Posterior oropharyngeal erythema present. No oropharyngeal exudate. Oropharynx is clear.   Eyes: Conjunctivae and lids are normal. Pupils are equal, round, and reactive to light. Right eye exhibits no discharge. Left eye exhibits no discharge. No scleral icterus.   Neck: Trachea normal and phonation normal. Neck supple. No neck rigidity present.   Cardiovascular: Normal rate, regular rhythm, normal heart sounds and normal pulses.   Pulmonary/Chest: Effort normal and breath sounds normal. No respiratory distress (Unlabored.  Equal rise and fall of chest.  Able speak in full complete sentences.  No adventitious breath sounds noted.). He has no wheezes. He has no rhonchi. He has no rales.   Abdominal: Normal appearance and bowel sounds are normal. He exhibits no distension. Soft. There is no abdominal tenderness.   Musculoskeletal:      Right shoulder: He exhibits decreased range of motion and tenderness. He exhibits no  swelling.      Cervical back: He exhibits no tenderness.   Lymphadenopathy:     He has no cervical adenopathy.   Neurological: He is alert and oriented to person, place, and time. He exhibits normal muscle tone.   Skin: Skin is warm, dry, intact, not diaphoretic, not pale and no rash. Capillary refill takes 2 to 3 seconds. No erythema   Psychiatric: His speech is normal and behavior is normal. Judgment and thought content normal.   Nursing note and vitals reviewed.      Assessment:     1. Chronic right shoulder pain    2. Encounter for medication refill    3. History of hypertension    4. Gastroesophageal reflux disease, unspecified whether esophagitis present    5. Acute non-recurrent frontal sinusitis        Plan:       Chronic right shoulder pain  -     Ambulatory referral/consult to Family Practice    Encounter for medication refill    History of hypertension  -     Ambulatory referral/consult to Family Practice    Gastroesophageal reflux disease, unspecified whether esophagitis present  -     Ambulatory referral/consult to Family Practice    Acute non-recurrent frontal sinusitis    Other orders  -     pantoprazole (PROTONIX) 40 MG tablet; Take 1 tablet (40 mg total) by mouth once daily.  Dispense: 30 tablet; Refill: 0  -     amLODIPine (NORVASC) 10 MG tablet; Take 1 tablet (10 mg total) by mouth once daily.  Dispense: 30 tablet; Refill: 0  -     methylPREDNISolone (MEDROL DOSEPACK) 4 mg tablet; use as directed  Dispense: 21 each; Refill: 0  -     amoxicillin-clavulanate 875-125mg (AUGMENTIN) 875-125 mg per tablet; Take 1 tablet by mouth every 12 (twelve) hours. for 10 days  Dispense: 20 tablet; Refill: 0  -     pyrilamine-chlophedianoL 12.5-12.5 mg/5 mL Liqd; Take 5 mLs by mouth every 6 to 8 hours as needed (Cough).  Dispense: 118 mL; Refill: 0  -     fluticasone propionate (FLONASE) 50 mcg/actuation nasal spray; 2 sprays (100 mcg total) by Each Nostril route once daily.  Dispense: 15.8 mL; Refill: 0  -      naproxen (NAPROSYN) 500 MG tablet; Take 1 tablet (500 mg total) by mouth 2 (two) times daily with meals. for 10 days  Dispense: 20 tablet; Refill: 0                Labs: Patient refused point of care testing.    Take medications as prescribed.    One month refills provided.    Nasal saline flushes or irrigation as directed.  Use of no other NSAIDs while on naproxen; may rotate with Tylenol.    Recommend rotating ice and warm moist heat as directed.    Follow-up orthopedics as previously scheduled; sooner as needed.    Referral placed for family care provider; follow-up once scheduled.    Return to clinic as needed.    To ED for any new or acutely worsening symptoms.    Patient in agreement with plan of care.    DISCLAIMER: Please note that my documentation in this Electronic Healthcare Record was produced using speech recognition software and therefore may contain errors related to that software system.These could include grammar, punctuation and spelling errors or the inclusion/exclusion of phrases that were not intended. Garbled syntax, mangled pronouns, and other bizarre constructions may be attributed to that software system.

## 2024-10-22 ENCOUNTER — OFFICE VISIT (OUTPATIENT)
Dept: URGENT CARE | Facility: CLINIC | Age: 59
End: 2024-10-22
Payer: MEDICAID

## 2024-10-22 VITALS
HEART RATE: 79 BPM | SYSTOLIC BLOOD PRESSURE: 139 MMHG | OXYGEN SATURATION: 97 % | DIASTOLIC BLOOD PRESSURE: 88 MMHG | TEMPERATURE: 97 F | RESPIRATION RATE: 18 BRPM

## 2024-10-22 DIAGNOSIS — Z76.0 ENCOUNTER FOR MEDICATION REFILL: ICD-10-CM

## 2024-10-22 DIAGNOSIS — M25.511 CHRONIC RIGHT SHOULDER PAIN: Primary | ICD-10-CM

## 2024-10-22 DIAGNOSIS — J32.4 CHRONIC PANSINUSITIS: ICD-10-CM

## 2024-10-22 DIAGNOSIS — S43.431D SUPERIOR GLENOID LABRUM LESION OF RIGHT SHOULDER, SUBSEQUENT ENCOUNTER: ICD-10-CM

## 2024-10-22 DIAGNOSIS — G89.29 CHRONIC RIGHT SHOULDER PAIN: Primary | ICD-10-CM

## 2024-10-22 DIAGNOSIS — Z86.79 HISTORY OF HYPERTENSION: ICD-10-CM

## 2024-10-22 DIAGNOSIS — Z87.19 HISTORY OF GASTROESOPHAGEAL REFLUX (GERD): ICD-10-CM

## 2024-10-22 PROCEDURE — 99214 OFFICE O/P EST MOD 30 MIN: CPT | Mod: S$GLB,,, | Performed by: STUDENT IN AN ORGANIZED HEALTH CARE EDUCATION/TRAINING PROGRAM

## 2024-10-22 RX ORDER — AMLODIPINE BESYLATE 10 MG/1
10 TABLET ORAL DAILY
Qty: 30 TABLET | Refills: 0 | Status: SHIPPED | OUTPATIENT
Start: 2024-10-22

## 2024-10-22 RX ORDER — DOXYCYCLINE HYCLATE 100 MG
100 TABLET ORAL 2 TIMES DAILY
Qty: 20 TABLET | Refills: 0 | Status: SHIPPED | OUTPATIENT
Start: 2024-10-22 | End: 2024-11-01

## 2024-10-22 RX ORDER — PANTOPRAZOLE SODIUM 40 MG/1
40 TABLET, DELAYED RELEASE ORAL DAILY
Qty: 30 TABLET | Refills: 0 | Status: SHIPPED | OUTPATIENT
Start: 2024-10-22

## 2024-10-22 RX ORDER — PROMETHAZINE HYDROCHLORIDE AND DEXTROMETHORPHAN HYDROBROMIDE 6.25; 15 MG/5ML; MG/5ML
5 SYRUP ORAL EVERY 4 HOURS PRN
Qty: 118 ML | Refills: 0 | Status: SHIPPED | OUTPATIENT
Start: 2024-10-22 | End: 2024-11-01

## 2024-10-22 RX ORDER — METHOCARBAMOL 750 MG/1
750 TABLET, FILM COATED ORAL 3 TIMES DAILY PRN
Qty: 15 TABLET | Refills: 0 | Status: SHIPPED | OUTPATIENT
Start: 2024-10-22

## 2024-10-22 NOTE — PROGRESS NOTES
Subjective:      Patient ID: Quinton Clayton is a 59 y.o. male.    Vitals:  temperature is 97.1 °F (36.2 °C). His blood pressure is 139/88 and his pulse is 79. His respiration is 18 and oxygen saturation is 97%.     Chief Complaint: Shoulder Pain and Sinus Problem    Patient is a 59-year-old male with a past medical history of GERD and hypertension who presents to clinic for evaluation of multiple complaints.  Patient previously seen in clinic on September 24, 2024 with similar.  Patient reports that he continues to experienced pain and decreased range of motion of the right shoulder.  Patient reports previously referred orthopedist has not yet called him to schedule appointment.  Patient states has been taking over-the-counter Tylenol with no significant relief to symptoms.  Patient states needs something a little stronger.  Patient states that he intermittently uses the shoulder sling he was provided with.  Patient states this does help when he uses it.  Patient states he also feels like he started having sinus infection again.  Patient states previously prescribed steroids helped.  Patient states he has recently started having symptoms again over the past 2-3 days.  Patient reports that he is vaccinated.  Patient reports no recent or known sick exposures.  Patient states has not taken any over-the-counter medications for his sinus issues at this time.    Note from previous visit on September 24th 2024: Patient is a 59-year-old male with a past medical history of GERD and hypertension who presents to clinic for evaluation of multiple complaints.  Patient reports the 1st is that URI and sinus related issues.  Patient reports symptoms x2 days.  Patient reports he is vaccinated.  Patient reports no recent or known sick exposures.  Patient reports no over-the-counter medications for symptoms at this point.  Patient states wants to get ahead of symptoms.  Patient states the 2nd complaint is that of chronic right  shoulder pain.  Patient states seeing Orthopedics.  Patient states had an MRI.  Patient states would possibility like something to help symptoms until he gets in for an appointment.  Patient also requesting refill on amlodipine and Protonix.  Patient states does not have local PCP.      Shoulder Pain   Associated symptoms include a limited range of motion (Right shoulder). Pertinent negatives include no fever, headaches or numbness.   Sinus Problem  Associated symptoms include congestion, coughing and sinus pressure. Pertinent negatives include no chills, diaphoresis, ear pain, headaches, shortness of breath or sore throat.       Constitution: Negative. Negative for chills, sweating, fatigue and fever.   HENT:  Positive for congestion, postnasal drip and sinus pressure. Negative for ear pain and sore throat.    Neck: neck negative.   Cardiovascular: Negative.  Negative for chest pain and palpitations.   Eyes: Negative.    Respiratory:  Positive for cough. Negative for chest tightness, sputum production and shortness of breath.    Gastrointestinal: Negative.  Negative for abdominal pain, nausea, vomiting and diarrhea.   Endocrine: negative.   Genitourinary: Negative.    Musculoskeletal:  Positive for joint pain (Right shoulder) and abnormal ROM of joint (Right shoulder). Negative for joint swelling.   Skin: Negative.  Negative for color change, pale, rash, wound and erythema.   Allergic/Immunologic: Negative.    Neurological: Negative.  Negative for dizziness, light-headedness, passing out, headaches, disorientation, altered mental status, numbness and tingling.   Hematologic/Lymphatic: Negative.    Psychiatric/Behavioral: Negative.  Negative for altered mental status, disorientation and confusion.       Objective:     Physical Exam   Constitutional: He is oriented to person, place, and time. He appears well-developed. He is cooperative.  Non-toxic appearance. He does not appear ill. No distress.   HENT:   Head:  Normocephalic and atraumatic.   Ears:   Right Ear: Hearing, tympanic membrane, external ear and ear canal normal.   Left Ear: Hearing, tympanic membrane, external ear and ear canal normal.   Nose: Congestion present. No mucosal edema, rhinorrhea or nasal deformity. No epistaxis. Right sinus exhibits maxillary sinus tenderness and frontal sinus tenderness. Left sinus exhibits maxillary sinus tenderness and frontal sinus tenderness.   Mouth/Throat: Uvula is midline, oropharynx is clear and moist and mucous membranes are normal. Mucous membranes are moist. No trismus in the jaw. Normal dentition. No uvula swelling. No oropharyngeal exudate or posterior oropharyngeal erythema. Oropharynx is clear.   Eyes: Conjunctivae and lids are normal. Pupils are equal, round, and reactive to light. Right eye exhibits no discharge. Left eye exhibits no discharge. No scleral icterus.   Neck: Trachea normal and phonation normal. Neck supple. No neck rigidity present.   Cardiovascular: Normal rate, regular rhythm, normal heart sounds and normal pulses.   Pulmonary/Chest: Effort normal and breath sounds normal. No respiratory distress (Unlabored.  Equal rise and fall of chest.  Able speak in full complete sentences.  No adventitious breath sounds noted.). He has no wheezes. He has no rhonchi. He has no rales.   Abdominal: Normal appearance and bowel sounds are normal. He exhibits no distension. Soft. There is no abdominal tenderness.   Musculoskeletal:      Right shoulder: He exhibits decreased range of motion and tenderness. He exhibits no swelling.      Cervical back: He exhibits no tenderness.   Lymphadenopathy:     He has no cervical adenopathy.   Neurological: He is alert and oriented to person, place, and time. He exhibits normal muscle tone.   Skin: Skin is warm, dry, intact, not diaphoretic, not pale and no rash. Capillary refill takes 2 to 3 seconds. No erythema   Psychiatric: His speech is normal and behavior is normal.  Judgment and thought content normal.   Nursing note and vitals reviewed.      Assessment:     1. Chronic right shoulder pain    2. Superior glenoid labrum lesion of right shoulder, subsequent encounter    3. History of hypertension    4. History of gastroesophageal reflux (GERD)    5. Encounter for medication refill        Plan:       Chronic right shoulder pain  -     Ambulatory referral/consult to Orthopedics  -     Ambulatory referral/consult to Family Practice    Superior glenoid labrum lesion of right shoulder, subsequent encounter  -     Ambulatory referral/consult to Orthopedics    History of hypertension  -     Ambulatory referral/consult to Family Practice    History of gastroesophageal reflux (GERD)  -     Ambulatory referral/consult to Family Practice    Encounter for medication refill    Other orders  -     pantoprazole (PROTONIX) 40 MG tablet; Take 1 tablet (40 mg total) by mouth once daily.  Dispense: 30 tablet; Refill: 0  -     amLODIPine (NORVASC) 10 MG tablet; Take 1 tablet (10 mg total) by mouth once daily.  Dispense: 30 tablet; Refill: 0  -     doxycycline (VIBRA-TABS) 100 MG tablet; Take 1 tablet (100 mg total) by mouth 2 (two) times daily. for 10 days  Dispense: 20 tablet; Refill: 0  -     promethazine-dextromethorphan (PROMETHAZINE-DM) 6.25-15 mg/5 mL Syrp; Take 5 mLs by mouth every 4 (four) hours as needed (Cough).  Dispense: 118 mL; Refill: 0  -     methocarbamoL (ROBAXIN) 750 MG Tab; Take 1 tablet (750 mg total) by mouth 3 (three) times daily as needed (Shoulder pain).  Dispense: 15 tablet; Refill: 0                Labs: Patient refused point of care testing.    Previous imaging reviewed.  Previous visits reviewed.  Take medications as prescribed.    One month refills provided.    Nasal saline flushes or irrigation as directed.  Use of no muscle relaxers while working, driving, or operating heavy machinery.  Continue use of shoulder sling as directed.  Recommend rotating ice and warm moist  heat as directed.    Patient reports has not followed up with orthopedist but would like a referral for another orthopedist in hopes maybe could get sooner appointment as he was not heard from his previous referral.  Referral placed for family care provider; follow-up once scheduled.    Return to clinic as needed.    To ED for any new or acutely worsening symptoms.    Patient in agreement with plan of care.     DISCLAIMER: Please note that my documentation in this Electronic Healthcare Record was produced using speech recognition software and therefore may contain errors related to that software system.These could include grammar, punctuation and spelling errors or the inclusion/exclusion of phrases that were not intended. Garbled syntax, mangled pronouns, and other bizarre constructions may be attributed to that software system.

## 2024-10-31 ENCOUNTER — OFFICE VISIT (OUTPATIENT)
Dept: ORTHOPEDICS | Facility: CLINIC | Age: 59
End: 2024-10-31
Payer: MEDICAID

## 2024-10-31 VITALS — HEIGHT: 70 IN | BODY MASS INDEX: 39.2 KG/M2 | WEIGHT: 273.81 LBS

## 2024-10-31 DIAGNOSIS — M75.121 COMPLETE TEAR OF RIGHT ROTATOR CUFF, UNSPECIFIED WHETHER TRAUMATIC: Primary | ICD-10-CM

## 2024-10-31 PROCEDURE — 3008F BODY MASS INDEX DOCD: CPT | Mod: CPTII,,, | Performed by: ORTHOPAEDIC SURGERY

## 2024-10-31 PROCEDURE — 1159F MED LIST DOCD IN RCRD: CPT | Mod: CPTII,,, | Performed by: ORTHOPAEDIC SURGERY

## 2024-10-31 PROCEDURE — 99213 OFFICE O/P EST LOW 20 MIN: CPT | Mod: PBBFAC,PN | Performed by: ORTHOPAEDIC SURGERY

## 2024-10-31 PROCEDURE — 99999 PR PBB SHADOW E&M-EST. PATIENT-LVL III: CPT | Mod: PBBFAC,,, | Performed by: ORTHOPAEDIC SURGERY

## 2024-10-31 PROCEDURE — 1160F RVW MEDS BY RX/DR IN RCRD: CPT | Mod: CPTII,,, | Performed by: ORTHOPAEDIC SURGERY

## 2024-10-31 PROCEDURE — 99205 OFFICE O/P NEW HI 60 MIN: CPT | Mod: S$PBB,,, | Performed by: ORTHOPAEDIC SURGERY

## 2024-10-31 RX ORDER — MELOXICAM 15 MG/1
15 TABLET ORAL DAILY
Qty: 30 TABLET | Refills: 1 | Status: SHIPPED | OUTPATIENT
Start: 2024-10-31

## 2024-11-20 ENCOUNTER — CLINICAL SUPPORT (OUTPATIENT)
Dept: REHABILITATION | Facility: HOSPITAL | Age: 59
End: 2024-11-20
Attending: ORTHOPAEDIC SURGERY
Payer: MEDICAID

## 2024-11-20 DIAGNOSIS — M25.611 DECREASED SHOULDER MOBILITY, RIGHT: Primary | ICD-10-CM

## 2024-11-20 DIAGNOSIS — M75.121 COMPLETE TEAR OF RIGHT ROTATOR CUFF, UNSPECIFIED WHETHER TRAUMATIC: ICD-10-CM

## 2024-11-20 PROCEDURE — 97161 PT EVAL LOW COMPLEX 20 MIN: CPT | Mod: PN

## 2024-11-20 PROCEDURE — 97110 THERAPEUTIC EXERCISES: CPT | Mod: PN

## 2024-11-20 NOTE — PROGRESS NOTES
[facility]  Physical Therapy Initial Evaluation    Date: 11/20/2024   Name: Quinton Clayton  Clinic Number: [id number]    Therapy Diagnosis:   Encounter Diagnoses   Name Primary?    Complete tear of right rotator cuff, unspecified whether traumatic     Decreased shoulder mobility, right Yes     Physician: Dung Youngblood,*    Physician Orders: PT Eval and Treat   Medical Diagnosis from Referral: M75.121 (ICD-10-CM) - Complete tear of right rotator cuff, unspecified whether traumatic   Evaluation Date: 11/20/2024  Authorization Period Expiration: 10/31/2025  Plan of Care Expiration: 1/3/2025  Visit # / Visits authorized: 1/ 1    Time In: 1050 am  Time Out: 1131 am  Total Appointment Time (timed & untimed codes): 41 minutes    Precautions: Standard    Subjective   Date of onset: fall a month ago  History of current condition - Quinton reports: he fell a month ago and had immediate shoulder pain. States he then finally got an MRI which showed a tear; states he does not want surgery. Pt reports sleeping at night has been difficulty/causing neck pain. Pt states his shoulder hurts with reaching up/to the side. Pt states rest makes it feel better. Pt states he feels like it is getting better. Pain extends from upper trap to Lateral delt on R. Pt denies any radiating pain/numbness/tingling. Pt denies any history of surgery to neck or shoulder.      Medical History:   Past Medical History:   Diagnosis Date    Allergy     Hypertension        Surgical History:   Quinton Clayton  has no past surgical history on file.    Medications:   Quinton has a current medication list which includes the following prescription(s): albuterol, albuterol-ipratropium, amlodipine, diclofenac, fluticasone propionate, levocetirizine, meloxicam, methocarbamol, pantoprazole, pyrilamine-chlophedianol, and sildenafil, and the following Facility-Administered Medications: acetaminophen, albuterol, epinephrine, sodium chloride  0.9% 500 mL flush bag, and sodium chloride 0.9%.    Allergies:   Review of patient's allergies indicates:  No Known Allergies     Imaging, MD note: Impression: 1. Complete tear of the supraspinatus tendon with tear extending to the overlap region with the infraspinatus.  Torn supraspinatus tendon fibers retracted to the level the glenohumeral joint.  2. Intrasubstance tear of the subscapularis and transverse ligament tear with biceps long head tendon dislocated from the biceps groove and perched on the lesser tuberosity of the humerus.  3. SLAP tear.  4. Mild-to-moderate degenerative changes of the AC joint.    Prior Therapy: N  Social History:  lives with their family  Occupation: none  Prior Level of Function: I  Current Level of Function: I; R shoulder pain    Pain:  Current 6/10, worst 9/10, best 0/10   Location: right shoulder    Description: Aching  Aggravating Factors: Laying, Night Time, Extension, Flexing, and Lifting  Easing Factors: rest    Pts goals: decrease pain; avoid surgery    Objective   Observation: calm and cooperative; R hand dominant     Posture:    Downwardly rotated R scapula    Passive Range of Motion:   Shoulder Left Right   Flexion 175 110; pain   ER at 0 full full   ER at 90 90 60; pain   IR 70 50; pain      Active Range of Motion:   Apley's Scratch Test:  Able on L   Behind Head:   Opp Shoulder:    Behind Back: T12 on L; unable on R    Shoulder Left Right   Flexion 175 75   ER at 0 full full                 Upper Extremity/Back Strength  Right UE   Left UE     ER at 0/90 abd: 4-/5; painful; NT/5 ER at 0/90 abd: 5/5; 4/5   IR at 0/90 abd: 5/5; NT/5 IR at 0/90 abd: 5/5; 4/5   Supraspinatus: 3/5; pain Supraspinatus 4+/5         Serratus Ant:  3-/5 Serratus Ant:  3-/5   Upper Trap:  3-/5 Upper Trap: 3-/5     Special Tests:     Impingement    Right Left   Neer's + -   Naik-Kenroy + -      Rotator Cuff:       Right Left   Drop Arm Test + -   Infraspinatus muscle Test + -        Joint  Mobility:    (R) posterior GH mobility: limited   (R) inferior GH mobility: limited      Sensation: WNL    Scapular Control/Dyskinesis: pain dominant; mild shrug within available range      Limitation/Restriction for FOTO Shoulder Survey    Therapist reviewed FOTO scores for Quinton Clayton on 11/20/2024.   FOTO documents entered into [employer] - see Media section.    Score: 4%  Predicted score: 51%       TREATMENT   Treatment Time In: 1114 am  Treatment Time Out: 1132 am  Total Treatment time (time-based codes) separate from Evaluation: 18 minutes    Quinton received therapeutic exercises to develop strength, endurance, ROM, flexibility and posture for 10 minutes including:    Standing Table Walk Aways PROM x 20   Seated Table slide x 20   ER wall isometrics submax pain-free x 20   Education - HEP / lift nothing heavier than a coffee cup / prognosis unclear     Quinton received the following manual therapy techniques: Joint mobilizations were applied to the: (R) shoulder for 8 minutes, including:    Manual pendulums   Posterior glide; grade 3   Inferior glide; grade 3     Home Exercises and Patient Education Provided    Education provided:   - Home Exercise Program    Written Home Exercises Provided: yes.  Exercises were reviewed and Quinton was able to demonstrate them prior to the end of the session.  Quinton demonstrated good  understanding of the education provided.     See EMR under Patient Instructions for exercises provided 11/20/2024.    Assessment   Quinton is a 59 y.o. male referred to outpatient Physical Therapy with a medical diagnosis of M75.121 (ICD-10-CM) - Complete tear of right rotator cuff, unspecified whether traumatic. Pt presents with limited and painful shoulder ROM, UQ weakness, joint tightness, abnormal posture and functional limitations of inability to lift R shoulder overhead. Quinton would benefit from skilled PT to address these deficits and facilitate a return to PLOF. Pt advised that his rehab may not  be enough to improve his function due to tearing of RTC. Pt verbalized understanding that prognosis may be poor if he does not make an progress in next month.     Pt prognosis is Good.   Pt will benefit from skilled outpatient Physical Therapy to address the deficits stated above and in the chart below, provide pt/family education, and to maximize pt's level of independence.     Plan of care discussed with patient: Yes  Pt's spiritual, cultural and educational needs considered and patient is agreeable to the plan of care and goals as stated below:     Anticipated Barriers for therapy: RTC tears    Medical Necessity is demonstrated by the following  History  Co-morbidities and personal factors that may impact the plan of care Co-morbidities:   high BMI and HTN    Personal Factors:   no deficits     moderate   Examination  Body Structures and Functions, activity limitations and participation restrictions that may impact the plan of care Body Regions:   neck  upper extremities    Body Systems:    gross symmetry  ROM  strength  gross coordinated movement  motor control  motor learning    Participation Restrictions:   none    Activity limitations:   no deficits    General Tasks and Commands  no deficits    Communication  no deficits    Mobility  lifting and carrying objects    Self care  washing oneself (bathing, drying, washing hands)  caring for body parts (brushing teeth, shaving, grooming)  dressing    Domestic Life  shopping  cooking  doing house work (cleaning house, washing dishes, laundry)  assisting others    Interactions/Relationships  no deficits    Life Areas  no deficits    Community and Social Life  community life  recreation and leisure         low   Clinical Presentation stable and uncomplicated low   Decision Making/ Complexity Score: low       GOALS:   Short Term Goals:  3 weeks  1.Report decreased shoulder pain < / =  6/10  to increase tolerance for ADL's   2. Increase PROM to 140 degrees  pain free.    3. Pt will improve AROM to 90 degrees pain-free  3. Increased strength by 1/3 MMT grade in shoulder strengthe to increase tolerance for ADL and work activities.  4. Pt to tolerate HEP to improve ROM and independence with ADL's.      Long Term Goals: 6 weeks  1.Report decreased shoulder pain  < / =  3 /10  to increase tolerance for ADL's.   2.Increase AROM to 110 degrees pain free.   3. Pt goal: return to home activities pain free with activity modification    Plan   Plan of care Certification: 11/20/2024 to 1/3/2024.    Outpatient Physical Therapy 2 times weekly for 6 weeks to include the following interventions: Gait Training, Manual Therapy, Moist Heat/ Ice, Neuromuscular Re-ed, Patient Education, Self Care, Therapeutic Activities, and Therapeutic Exercise.     Noel Shafer, PT

## 2024-11-25 ENCOUNTER — CLINICAL SUPPORT (OUTPATIENT)
Dept: REHABILITATION | Facility: HOSPITAL | Age: 59
End: 2024-11-25
Payer: MEDICAID

## 2024-11-25 DIAGNOSIS — M25.611 DECREASED SHOULDER MOBILITY, RIGHT: Primary | ICD-10-CM

## 2024-11-25 PROCEDURE — 97110 THERAPEUTIC EXERCISES: CPT | Mod: PN,CQ

## 2024-11-25 NOTE — PROGRESS NOTES
"[facility]   Physical Therapy Treatment Note      Name: Quinton Clayton  Clinic Number: [id number]    Therapy Diagnosis:   Encounter Diagnosis   Name Primary?    Decreased shoulder mobility, right Yes     Physician: Dung Youngblood,*    Visit Date: 11/25/2024       Physician Orders: PT Eval and Treat   Medical Diagnosis from Referral: M75.121 (ICD-10-CM) - Complete tear of right rotator cuff, unspecified whether traumatic   Evaluation Date: 11/20/2024  Authorization Period Expiration: 10/31/2025  Plan of Care Expiration: 1/3/2025  Visit # / Visits authorized: 1/ 20     Time In: 1100 am  Time Out: 1200 pm  Total Appointment Time (timed & untimed codes): 54 minutes     Precautions: Standard    PTA Visit #: 1/5       Subjective     Patient reports: "If I move it the right way its painful." Denies pain at rest.     He was compliant with home exercise program.  Response to previous treatment: first visit post initial evaluation   Functional change: first visit post initial evaluation     Pain: 1/10  Location: right shoulder      Objective      Objective Measures updated at progress report unless specified.     Treatment     Quinton received the treatments listed below:      therapeutic exercises to develop strength, endurance, ROM, flexibility, and posture for 53 minutes including:    Shoulder flexion AAROM x 30 repetitions   Sidelying shoulder flexion with dowel x 30 repetitions   Standing walk outs for shoulder flexion x 30 repetitions   Shoulder isometrics in all planes of motion 5 seconds holds x 15 repetitions - submax   Attempted SA in sidelying but unable to tolerate  Table slides flexion and scaption x 3 minutes each   Wall slides with towel 3 x 10 repetitions     manual therapy techniques: Joint mobilizations were applied to the: right shoulder, including:    Manual pendulums   Posterior and inferior grade III       Patient Education and Home Exercises       Education provided:   - " Home Exercise Program   -Pain science     Written Home Exercises Provided: Yes. Exercises were reviewed and Quinton was able to demonstrate them prior to the end of the session.  Quinton demonstrated good  understanding of the education provided. See Electronic Medical Record under Patient Instructions for exercises provided during therapy sessions    Assessment     Quinton notes with fair tolerance to treatment. He requires significant cueing to remain within his pain tolerance. He also requires cueing to improve form and execution as expected. Provided significant education on pain science to improve understanding of rehab process. He verbalized understanding. Treatment focused on improving joint range of motion and strength within his tolerance. Will continue to benefit from skilled Physical Therapy to maximize strength gains as able.     Quinton Is progressing well towards his goals.   Patient prognosis is Good.     Patient will continue to benefit from skilled outpatient physical therapy to address the deficits listed in the problem list box on initial evaluation, provide pt/family education and to maximize pt's level of independence in the home and community environment.     Patient's spiritual, cultural and educational needs considered and pt agreeable to plan of care and goals.     Anticipated barriers to physical therapy: RTC tears     Goals:     Short Term Goals:  3 weeks  1.Report decreased shoulder pain < / =  6/10  to increase tolerance for ADL's   2. Increase PROM to 140 degrees  pain free.   3. Pt will improve AROM to 90 degrees pain-free  3. Increased strength by 1/3 MMT grade in shoulder strengthe to increase tolerance for ADL and work activities.  4. Pt to tolerate HEP to improve ROM and independence with ADL's.      Long Term Goals: 6 weeks  1.Report decreased shoulder pain  < / =  3 /10  to increase tolerance for ADL's.   2.Increase AROM to 110 degrees pain free.   3. Pt goal: return to home activities  pain free with activity modification    Plan     Progress as per POC.     Tamiko Mejía, PTA

## 2024-12-05 ENCOUNTER — CLINICAL SUPPORT (OUTPATIENT)
Dept: REHABILITATION | Facility: HOSPITAL | Age: 59
End: 2024-12-05
Payer: MEDICAID

## 2024-12-05 DIAGNOSIS — M25.611 DECREASED SHOULDER MOBILITY, RIGHT: Primary | ICD-10-CM

## 2024-12-05 PROCEDURE — 97110 THERAPEUTIC EXERCISES: CPT | Mod: PN

## 2024-12-05 NOTE — PROGRESS NOTES
[facility]   Physical Therapy Treatment Note      Name: Quinton Clayton  Clinic Number: [id number]    Therapy Diagnosis:   Encounter Diagnosis   Name Primary?    Decreased shoulder mobility, right Yes       Physician: Dung Youngblood,*    Visit Date: 12/5/2024    Physician Orders: PT Eval and Treat   Medical Diagnosis from Referral: M75.121 (ICD-10-CM) - Complete tear of right rotator cuff, unspecified whether traumatic   Evaluation Date: 11/20/2024  Authorization Period Expiration: 10/31/2025  Plan of Care Expiration: 1/3/2025  Visit # / Visits authorized: 2 / 20     Time In: 1100 am  Time Out: 1145 am  Total Appointment Time (timed & untimed codes): 45 minutes     Precautions: Standard    PTA Visit #: 1/5  Subjective     Patient reports: feeling better. Less pain.     He was compliant with home exercise program.  Response to previous treatment: felt okay  Functional change: lifting arm better     Pain: 1/10  Location: right shoulder      Objective      AROM: 95 degrees of flexion     Treatment     Quinton received the treatments listed below:      therapeutic exercises to develop strength, endurance, ROM, flexibility, and posture for 45 minutes including:    UBE 3 min Fwd/3min Bwd   Seated Inferior [place] Prop at 70 abd x 5 minutes  Shoulder flexion hands together - AAROM x 30 repetitions   Sidelying shoulder flexion with dowel x 30 repetitions   Standing walk outs for shoulder flexion x 30 repetitions   ER Walkout 3 x 10 YTB  IR Walkout 3 x 10 RTB  Table slides flexion and scaption x 3 minutes each   Standing Railing Slide at stairs x 30     manual therapy techniques: Joint mobilizations were applied to the: right shoulder, including:    Manual pendulums   Posterior and inferior grade III       Patient Education and Home Exercises       Education provided:   - Home Exercise Program   -Pain science     Written Home Exercises Provided: Yes. Exercises were reviewed and Quinton was able to  demonstrate them prior to the end of the session.  Quinton demonstrated good  understanding of the education provided. See Electronic Medical Record under Patient Instructions for exercises provided during therapy sessions    Assessment     Quinton did well today. Demonstrates 20 degree improvement in AROM. Added isometrics today with fair tolerance. Some pain into 2-3 sets so did not add to HEP. Inferior glide prop added to HEP 2x/day pain-free. Plan to continue to progress as tolerated.     Quinton Is progressing well towards his goals.   Patient prognosis is Good.     Patient will continue to benefit from skilled outpatient physical therapy to address the deficits listed in the problem list box on initial evaluation, provide pt/family education and to maximize pt's level of independence in the home and community environment.     Patient's spiritual, cultural and educational needs considered and pt agreeable to plan of care and goals.     Anticipated barriers to physical therapy: RTC tears     Goals:     Short Term Goals:  3 weeks  1.Report decreased shoulder pain < / =  6/10  to increase tolerance for ADL's   2. Increase PROM to 140 degrees  pain free.   3. Pt will improve AROM to 90 degrees pain-free  3. Increased strength by 1/3 MMT grade in shoulder strengthe to increase tolerance for ADL and work activities.  4. Pt to tolerate HEP to improve ROM and independence with ADL's.      Long Term Goals: 6 weeks  1.Report decreased shoulder pain  < / =  3 /10  to increase tolerance for ADL's.   2.Increase AROM to 110 degrees pain free.   3. Pt goal: return to home activities pain free with activity modification    Plan     Progress as per POC.     Noel Shafer, PT, DPT

## 2024-12-09 ENCOUNTER — CLINICAL SUPPORT (OUTPATIENT)
Dept: REHABILITATION | Facility: HOSPITAL | Age: 59
End: 2024-12-09
Payer: MEDICAID

## 2024-12-09 DIAGNOSIS — M25.611 DECREASED SHOULDER MOBILITY, RIGHT: Primary | ICD-10-CM

## 2024-12-09 PROCEDURE — 97110 THERAPEUTIC EXERCISES: CPT | Mod: PN,CQ

## 2024-12-09 NOTE — PROGRESS NOTES
[facility]   Physical Therapy Treatment Note      Name: Quinton Clayton  Clinic Number: [id number]    Therapy Diagnosis:   Encounter Diagnosis   Name Primary?    Decreased shoulder mobility, right Yes       Physician: Dung Youngblood,*    Visit Date: 12/9/2024    Physician Orders: PT Eval and Treat   Medical Diagnosis from Referral: M75.121 (ICD-10-CM) - Complete tear of right rotator cuff, unspecified whether traumatic   Evaluation Date: 11/20/2024  Authorization Period Expiration: 10/31/2025  Plan of Care Expiration: 1/3/2025  Visit # / Visits authorized: 3 / 20     Time In: 1100 am  Time Out: 1145 am  Total Appointment Time (timed & untimed codes): 45 minutes     Precautions: Standard    PTA Visit #: 1/5  Subjective     Patient reports: Still has pain with certain movements but overall amazed at how much he is able to move it.     He was compliant with home exercise program.  Response to previous treatment: felt okay  Functional change: lifting arm better     Pain: 1/10  Location: right shoulder      Objective      AROM: 95 degrees of flexion     Treatment     Quinton received the treatments listed below:      therapeutic exercises to develop strength, endurance, ROM, flexibility, and posture for 45 minutes including:    UBE 3 min Fwd/3min Bwd   Seated Inferior [place] Prop at 70 abd x 5 minutes  Shoulder flexion hands together - AAROM x 30 repetitions   Sidelying shoulder flexion with dowel x 30 repetitions   Standing walk outs for shoulder flexion x 30 repetitions   ER Walkout 3 x 10 YTB  IR Walkout 3 x 10 RTB  Table slides flexion and scaption x 3 minutes each   Standing Railing Slide at stairs x 30     manual therapy techniques: Joint mobilizations were applied to the: right shoulder, including:    Manual pendulums   Posterior and inferior grade III       Patient Education and Home Exercises       Education provided:   - Home Exercise Program   -Pain science     Written Home  Exercises Provided: Yes. Exercises were reviewed and Quinton was able to demonstrate them prior to the end of the session.  Quinton demonstrated good  understanding of the education provided. See Electronic Medical Record under Patient Instructions for exercises provided during therapy sessions    Assessment     Improved tolerance to isometrics today ensuring active break between sets. Continued focus on improving range of motion and strength within his tolerance. Will progress as able.     Quinton Is progressing well towards his goals.   Patient prognosis is Good.     Patient will continue to benefit from skilled outpatient physical therapy to address the deficits listed in the problem list box on initial evaluation, provide pt/family education and to maximize pt's level of independence in the home and community environment.     Patient's spiritual, cultural and educational needs considered and pt agreeable to plan of care and goals.     Anticipated barriers to physical therapy: RTC tears     Goals:     Short Term Goals:  3 weeks  1.Report decreased shoulder pain < / =  6/10  to increase tolerance for ADL's   2. Increase PROM to 140 degrees  pain free.   3. Pt will improve AROM to 90 degrees pain-free  3. Increased strength by 1/3 MMT grade in shoulder strengthe to increase tolerance for ADL and work activities.  4. Pt to tolerate HEP to improve ROM and independence with ADL's.      Long Term Goals: 6 weeks  1.Report decreased shoulder pain  < / =  3 /10  to increase tolerance for ADL's.   2.Increase AROM to 110 degrees pain free.   3. Pt goal: return to home activities pain free with activity modification    Plan     Progress as per POC.     Tamiko Mejía, PTA

## 2024-12-12 ENCOUNTER — OFFICE VISIT (OUTPATIENT)
Dept: URGENT CARE | Facility: CLINIC | Age: 59
End: 2024-12-12
Payer: MEDICAID

## 2024-12-12 ENCOUNTER — CLINICAL SUPPORT (OUTPATIENT)
Dept: REHABILITATION | Facility: HOSPITAL | Age: 59
End: 2024-12-12
Payer: MEDICAID

## 2024-12-12 VITALS
SYSTOLIC BLOOD PRESSURE: 142 MMHG | OXYGEN SATURATION: 96 % | WEIGHT: 273 LBS | TEMPERATURE: 98 F | DIASTOLIC BLOOD PRESSURE: 90 MMHG | HEIGHT: 70 IN | HEART RATE: 70 BPM | BODY MASS INDEX: 39.08 KG/M2

## 2024-12-12 DIAGNOSIS — K21.9 GASTROESOPHAGEAL REFLUX DISEASE, UNSPECIFIED WHETHER ESOPHAGITIS PRESENT: ICD-10-CM

## 2024-12-12 DIAGNOSIS — M25.611 DECREASED SHOULDER MOBILITY, RIGHT: Primary | ICD-10-CM

## 2024-12-12 DIAGNOSIS — I10 HYPERTENSION, UNSPECIFIED TYPE: ICD-10-CM

## 2024-12-12 DIAGNOSIS — J01.10 SUBACUTE FRONTAL SINUSITIS: Primary | ICD-10-CM

## 2024-12-12 PROCEDURE — 99214 OFFICE O/P EST MOD 30 MIN: CPT | Mod: S$GLB,,,

## 2024-12-12 PROCEDURE — 97110 THERAPEUTIC EXERCISES: CPT | Mod: PN,CQ

## 2024-12-12 RX ORDER — PANTOPRAZOLE SODIUM 40 MG/1
40 TABLET, DELAYED RELEASE ORAL DAILY
Qty: 90 TABLET | Refills: 3 | Status: SHIPPED | OUTPATIENT
Start: 2024-12-12 | End: 2025-12-12

## 2024-12-12 RX ORDER — DOXYCYCLINE 100 MG/1
100 CAPSULE ORAL 2 TIMES DAILY
Qty: 14 CAPSULE | Refills: 0 | Status: SHIPPED | OUTPATIENT
Start: 2024-12-12 | End: 2024-12-19

## 2024-12-12 RX ORDER — AMLODIPINE BESYLATE 10 MG/1
10 TABLET ORAL DAILY
Qty: 90 TABLET | Refills: 3 | Status: SHIPPED | OUTPATIENT
Start: 2024-12-12 | End: 2025-12-12

## 2024-12-12 NOTE — PROGRESS NOTES
"Subjective:      Patient ID: Quinton Clayton is a 59 y.o. male.    Vitals:  height is 5' 10" (1.778 m) and weight is 123.8 kg (273 lb). His oral temperature is 97.9 °F (36.6 °C). His blood pressure is 142/90 (abnormal) and his pulse is 70. His oxygen saturation is 96%.     Chief Complaint: Cough    Patient presenting to clinic with a complaint of sinus pain and pressure, congestion, rhinorrhea, postnasal drip, and productive cough for a week and a half.  Cough is productive of white foamy phlegm.  Denies fever.  Denies chest pain or shortness for breath.  Denies ill contacts.  Has not vaccinated for influenza COVID.  Denies smoking history.  Patient has a history of hypertension, and states needs a refill on his Norvasc as well as Protonix.  Patient does have a primary care doctor, but is unable to follow up with the meds of current.    Cough  This is a new problem. The current episode started 1 to 4 weeks ago (1.5 weeks ago). Associated symptoms include heartburn. Pertinent negatives include no fever. Treatments tried: nyquil.       Constitution: Negative for fever.   HENT:  Positive for congestion, sinus pain and sinus pressure.    Neck: neck negative.   Cardiovascular: Negative.    Eyes: Negative.    Respiratory:  Positive for cough and sputum production.    Gastrointestinal:  Positive for heartburn.   Genitourinary: Negative.    Musculoskeletal: Negative.    Skin: Negative.    Allergic/Immunologic: Positive for immunizations up-to-date. Negative for flu shot.   Neurological: Negative.    Hematologic/Lymphatic: Negative.    Psychiatric/Behavioral: Negative.        Objective:     Physical Exam   Constitutional: He is oriented to person, place, and time. He is cooperative.  Non-toxic appearance. He does not appear ill. No distress. obesity  HENT:   Head: Normocephalic and atraumatic.   Ears:   Right Ear: Hearing, external ear and ear canal normal. No tenderness. No mastoid tenderness. Tympanic membrane is bulging. " Tympanic membrane is not injected, not scarred, not perforated and not erythematous. A middle ear effusion is present.   Left Ear: Hearing, tympanic membrane, external ear and ear canal normal.   Nose: Right sinus exhibits frontal sinus tenderness. Right sinus exhibits no maxillary sinus tenderness. Left sinus exhibits frontal sinus tenderness. Left sinus exhibits no maxillary sinus tenderness.   Mouth/Throat: Uvula is midline, oropharynx is clear and moist and mucous membranes are normal. Mucous membranes are moist. No oropharyngeal exudate or posterior oropharyngeal erythema. Oropharynx is clear.   Eyes: Conjunctivae and lids are normal. Pupils are equal, round, and reactive to light. Extraocular movement intact   Neck: Trachea normal and phonation normal. Neck supple.   Cardiovascular: Normal rate, regular rhythm, normal heart sounds and normal pulses.   Pulmonary/Chest: Effort normal and breath sounds normal. He has no wheezes. He has no rhonchi. He has no rales. He exhibits no tenderness.   Abdominal: Normal appearance. Soft. flat abdomen There is no abdominal tenderness.   Musculoskeletal: Normal range of motion.         General: Normal range of motion.   Lymphadenopathy:     He has no cervical adenopathy.   Neurological: no focal deficit. He is alert, oriented to person, place, and time and at baseline. He has normal motor skills, normal sensation and intact cranial nerves (2-12). Gait and coordination normal. GCS eye subscore is 4. GCS verbal subscore is 5. GCS motor subscore is 6.   Skin: Skin is warm, dry and not diaphoretic. Capillary refill takes 2 to 3 seconds.   Psychiatric: He experiences Normal attention and Normal perception. His speech is normal and behavior is normal. Mood, judgment and thought content normal.   Nursing note and vitals reviewed.    Assessment:     No diagnosis found.    Plan:       There are no diagnoses linked to this encounter.          Patient is afebrile in clinic.  Denies  reported history of fever.  Complaining of facial pains, congestion, and cough.  Denies history of smoking.  No history of bronchitis recurrent sinusitis.  Physical exam right TM bulging.  No erythema.  Does have mixed air-fluid levels.  Suspect Eustachian tube dysfunction 2nd to sinusitis.  Frontal sinuses are grossly tender and he is guarding.  No reported rust colored mucus.  Lungs are clear to auscultation.  Vital signs are stable.  Also requires blood pressure medication refills.  States can not see his primary care doctor for 3-4 more months.  Also requesting refill on Protonix.

## 2024-12-12 NOTE — PROGRESS NOTES
[facility]   Physical Therapy Treatment Note      Name: Quinton Clayton  Clinic Number: [id number]    Therapy Diagnosis:   Encounter Diagnosis   Name Primary?    Decreased shoulder mobility, right Yes       Physician: Dung Youngblood,*    Visit Date: 12/12/2024    Physician Orders: PT Eval and Treat   Medical Diagnosis from Referral: M75.121 (ICD-10-CM) - Complete tear of right rotator cuff, unspecified whether traumatic   Evaluation Date: 11/20/2024  Authorization Period Expiration: 10/31/2025  Plan of Care Expiration: 1/3/2025  Visit # / Visits authorized: 4 / 20     Time In: 1100 am  Time Out: 1145 am  Total Appointment Time (timed & untimed codes): 45 minutes     Precautions: Standard    PTA Visit #: 1/5  Subjective     Patient reports: Doing well. No new complaints.     He was compliant with home exercise program.  Response to previous treatment: felt okay  Functional change: lifting arm better     Pain: 1/10  Location: right shoulder      Objective      AROM: 95 degrees of flexion     Treatment     Quinton received the treatments listed below:      therapeutic exercises to develop strength, endurance, ROM, flexibility, and posture for 45 minutes including:    UBE 3 min Fwd/3min Bwd   Seated Inferior Rembert Prop at 70 abd x 5 minutes  Shoulder flexion hands together - AAROM x 30 repetitions   Sidelying shoulder flexion with dowel x 30 repetitions   Standing walk outs for shoulder flexion x 30 repetitions   ER Walkout 3 x 10 YTB  IR Walkout 3 x 10 RTB  Table slides flexion and scaption x 3 minutes each   Standing Railing Slide at stairs x 30   Shoulder flexion to 90 degrees with yellow theraband around both wrist 3 x 10 repetitions     manual therapy techniques: Joint mobilizations were applied to the: right shoulder, including:    Manual pendulums   Posterior and inferior grade III       Patient Education and Home Exercises       Education provided:   - Home Exercise Program   -Pain  science     Written Home Exercises Provided: Yes. Exercises were reviewed and Quinton was able to demonstrate them prior to the end of the session.  Quinton demonstrated good  understanding of the education provided. See Electronic Medical Record under Patient Instructions for exercises provided during therapy sessions    Assessment     Incorporated shoulder flexion to 90 with theraband to improve RTC activation to improve functional strength. He noted with good tolerance to this but presented with positive shrug sign. Will continue to benefit from skilled Physical Therapy to maximize gains as able.     Quinton Is progressing well towards his goals.   Patient prognosis is Good.     Patient will continue to benefit from skilled outpatient physical therapy to address the deficits listed in the problem list box on initial evaluation, provide pt/family education and to maximize pt's level of independence in the home and community environment.     Patient's spiritual, cultural and educational needs considered and pt agreeable to plan of care and goals.     Anticipated barriers to physical therapy: RTC tears     Goals:     Short Term Goals:  3 weeks  1.Report decreased shoulder pain < / =  6/10  to increase tolerance for ADL's   2. Increase PROM to 140 degrees  pain free.   3. Pt will improve AROM to 90 degrees pain-free  3. Increased strength by 1/3 MMT grade in shoulder strengthe to increase tolerance for ADL and work activities.  4. Pt to tolerate HEP to improve ROM and independence with ADL's.      Long Term Goals: 6 weeks  1.Report decreased shoulder pain  < / =  3 /10  to increase tolerance for ADL's.   2.Increase AROM to 110 degrees pain free.   3. Pt goal: return to home activities pain free with activity modification    Plan     Progress as per POC.     Tamiko Mejía, PTA

## 2024-12-18 ENCOUNTER — DOCUMENTATION ONLY (OUTPATIENT)
Dept: REHABILITATION | Facility: HOSPITAL | Age: 59
End: 2024-12-18
Payer: MEDICAID

## 2024-12-23 ENCOUNTER — CLINICAL SUPPORT (OUTPATIENT)
Dept: REHABILITATION | Facility: HOSPITAL | Age: 59
End: 2024-12-23
Payer: MEDICAID

## 2024-12-23 DIAGNOSIS — M25.611 DECREASED SHOULDER MOBILITY, RIGHT: Primary | ICD-10-CM

## 2024-12-23 PROCEDURE — 97110 THERAPEUTIC EXERCISES: CPT | Mod: PN

## 2024-12-23 NOTE — PROGRESS NOTES
[facility]   Physical Therapy Treatment Note      Name: Quinton Clayton  [facility] Number: [id number]    Therapy Diagnosis:   Encounter Diagnosis   Name Primary?    Decreased shoulder mobility, right Yes     Physician: Dung Youngblood,*    Visit Date: 12/23/2024    Physician Orders: PT Eval and Treat   Medical Diagnosis from Referral: M75.121 (ICD-10-CM) - Complete tear of right rotator cuff, unspecified whether traumatic   Evaluation Date: 11/20/2024  Authorization Period Expiration: 10/31/2025  Plan of Care Expiration: 1/3/2025  Visit # / Visits authorized: 5 / 20     Time In: 11:00 am  Time Out: 11:40 am  Total Appointment Time (timed & untimed codes): 40 minutes     Precautions: Standard    PTA Visit #: 1/5  Subjective     Patient reports: Doing well. Feels better. States going out to the side still hurts. States he does his exercises a lot.     He was compliant with home exercise program.  Response to previous treatment: felt okay  Functional change: lifting arm better     Pain: 1/10  Location: right shoulder      Objective      Passive Range of Motion:   Shoulder Left Right Eval Right 12/23   Flexion 175 110; pain 140  degrees   ER at 0 full full full   ER at 90 90 60; pain 60; painful   IR 70 50; pain 50; pain      Active Range of Motion:   Apley's Scratch Test:  Able on L              Behind Head:              Opp Shoulder: able on R              Behind Back: T12 on L; belt line on R     Shoulder Left Right Eval / Right 12/23   Flexion 175 75 / 125    ER at 0 full full                     Upper Extremity/Back Strength  Right UE  EVAL 12/23/2024 Left UE     ER at 0/90 abd: 4-/5; painful; NT/5 4+/5; no pain  3+/5 painful ER at 0/90 abd: 5/5; 4/5   IR at 0/90 abd: 5/5; NT/5  IR at 0/90 abd: 5/5; 4/5   Supraspinatus: 3/5; pain 3/5 painful Supraspinatus 4+/5              Serratus Ant:  3-/5 3+/5 Serratus Ant:  3-/5   Upper Trap:  3-/5 3+/5 Upper Trap: 3-/5     Treatment     Quinton  received the treatments listed below:      therapeutic exercises to develop strength, endurance, ROM, flexibility, and posture for 40 minutes including:    Assessment above  UBE 3 min Fwd/3min Bwd   Seated Inferior Crosby Prop at 90 abd; 4# x 5 minutes  Supine Shoulder flexion hands together - AAROM x 30 repetitions   Sidelying shoulder flexion with dowel x 30 repetitions   Seated No Money YTB 3 x 10   Standing walk outs for shoulder flexion x 30 repetitions   Pulleys x 3 minutes in scaption plane    Not today:   ER Walkout at 45 flexion; 3 x 10 YTB  IR Walkout at 45 flexion; 3 x 10 RTB  Shoulder Walks with 1# 3 x 10 yards  Standing Railing Slide at stairs x 30   Shoulder flexion to 90 degrees with yellow theraband around both wrist 3 x 10 repetitions     manual therapy techniques: Joint mobilizations were applied to the: right shoulder, including:    Manual pendulums   Posterior and inferior grade III       Patient Education and Home Exercises       Education provided:   - Home Exercise Program   -Pain science     Written Home Exercises Provided: Yes. Exercises were reviewed and Quinton was able to demonstrate them prior to the end of the session.  Quinton demonstrated good  understanding of the education provided. See Electronic Medical Record under Patient Instructions for exercises provided during therapy sessions    Assessment     Quinton has completed 5 sessions of PT and demonstrates significant improvement in ROM but continues to lack strength; especially into Abd or overhead. He had increased pain today with familiar exercises so the session was cut short. After discussing what he is doing at home, I think he may be overdoing it. We reviewed his HEP and I educated him on the importance of rest. Pt verbalized understanding. Offered patient rest to end the session but he declined stating he would do so at home.     Quinton Is progressing well towards his goals.   Patient prognosis is Good.     Patient will continue to  benefit from skilled outpatient physical therapy to address the deficits listed in the problem list box on initial evaluation, provide pt/family education and to maximize pt's level of independence in the home and community environment.     Patient's spiritual, cultural and educational needs considered and pt agreeable to plan of care and goals.     Anticipated barriers to physical therapy: RTC tears     Goals:     Short Term Goals:  3 weeks  1.Report decreased shoulder pain < / =  6/10  to increase tolerance for ADL's   2. Increase PROM to 140 degrees  pain free.   3. Pt will improve AROM to 90 degrees pain-free  3. Increased strength by 1/3 MMT grade in shoulder strengthe to increase tolerance for ADL and work activities.  4. Pt to tolerate HEP to improve ROM and independence with ADL's.      Long Term Goals: 6 weeks  1.Report decreased shoulder pain  < / =  3 /10  to increase tolerance for ADL's.   2.Increase AROM to 110 degrees pain free.   3. Pt goal: return to home activities pain free with activity modification    Plan     Progress as per POC.     Noel Shafer, PT, DPT

## 2024-12-31 ENCOUNTER — OFFICE VISIT (OUTPATIENT)
Dept: URGENT CARE | Facility: CLINIC | Age: 59
End: 2024-12-31
Payer: MEDICAID

## 2024-12-31 VITALS
HEIGHT: 70 IN | HEART RATE: 78 BPM | OXYGEN SATURATION: 97 % | TEMPERATURE: 99 F | BODY MASS INDEX: 39.08 KG/M2 | SYSTOLIC BLOOD PRESSURE: 151 MMHG | RESPIRATION RATE: 18 BRPM | WEIGHT: 273 LBS | DIASTOLIC BLOOD PRESSURE: 93 MMHG

## 2024-12-31 DIAGNOSIS — J01.41 RECURRENT PANSINUSITIS: Primary | ICD-10-CM

## 2024-12-31 DIAGNOSIS — J02.9 SORE THROAT: ICD-10-CM

## 2024-12-31 PROCEDURE — 99214 OFFICE O/P EST MOD 30 MIN: CPT | Mod: S$GLB,,,

## 2024-12-31 PROCEDURE — 87804 INFLUENZA ASSAY W/OPTIC: CPT | Mod: QW,,,

## 2024-12-31 PROCEDURE — 87811 SARS-COV-2 COVID19 W/OPTIC: CPT | Mod: QW,S$GLB,,

## 2024-12-31 RX ORDER — AMOXICILLIN AND CLAVULANATE POTASSIUM 875; 125 MG/1; MG/1
1 TABLET, FILM COATED ORAL EVERY 12 HOURS
Qty: 14 TABLET | Refills: 0 | Status: SHIPPED | OUTPATIENT
Start: 2024-12-31 | End: 2025-01-07

## 2024-12-31 NOTE — PROGRESS NOTES
"Subjective:      Patient ID: Quinton Clayton is a 59 y.o. male.    Vitals:  height is 5' 10" (1.778 m) and weight is 123.8 kg (273 lb). His oral temperature is 98.5 °F (36.9 °C). His blood pressure is 151/93 (abnormal) and his pulse is 78. His respiration is 18 and oxygen saturation is 97%.     Chief Complaint: Sinus Problem    Frontal headache bilaterally, nasal congestion, postnasal drip, and productive cough for 3-4 days.  History of recurrent sinus infections.  OTC medication has been not effective.  Denies fever.    Sinus Problem  This is a new problem. The current episode started in the past 7 days. The problem has been gradually worsening since onset. There has been no fever. His pain is at a severity of 7/10. The pain is mild. Associated symptoms include congestion, coughing, headaches, a hoarse voice, shortness of breath, sinus pressure and sneezing. Pertinent negatives include no ear pain, sore throat or swollen glands. Past treatments include oral decongestants (nyquil). The treatment provided no relief.       Constitution: Negative for fever.   HENT:  Positive for congestion, sinus pain and sinus pressure. Negative for ear pain and sore throat.    Neck: neck negative.   Eyes: Negative.    Respiratory:  Positive for cough, sputum production and shortness of breath.    Gastrointestinal: Negative.    Endocrine: negative.   Genitourinary: Negative.    Musculoskeletal: Negative.    Skin: Negative.    Allergic/Immunologic: Positive for sneezing.   Neurological:  Positive for headaches.   Hematologic/Lymphatic: Negative.    Psychiatric/Behavioral: Negative.        Objective:     Physical Exam   Constitutional: He is oriented to person, place, and time. He is cooperative.  Non-toxic appearance. He does not appear ill. No distress. obesity  HENT:   Head: Normocephalic and atraumatic.   Ears:   Right Ear: Tympanic membrane, external ear and ear canal normal.   Left Ear: Tympanic membrane, external ear and ear canal " normal.   Nose: Right sinus exhibits maxillary sinus tenderness. Right sinus exhibits no frontal sinus tenderness. Left sinus exhibits maxillary sinus tenderness and frontal sinus tenderness.   Mouth/Throat: Uvula is midline, oropharynx is clear and moist and mucous membranes are normal. Mucous membranes are moist. Oropharynx is clear.   Eyes: Conjunctivae and lids are normal. Pupils are equal, round, and reactive to light. Extraocular movement intact   Neck: Trachea normal and phonation normal. Neck supple.   Cardiovascular: Normal rate, regular rhythm, normal heart sounds and normal pulses.   Pulmonary/Chest: Effort normal and breath sounds normal. He has no wheezes. He has no rhonchi. He has no rales. He exhibits no tenderness.   Abdominal: Normal appearance.   Musculoskeletal: Normal range of motion.         General: Normal range of motion.   Neurological: no focal deficit. He is alert, oriented to person, place, and time and at baseline. He has normal motor skills, normal sensation and intact cranial nerves (2-12). Gait and coordination normal. GCS eye subscore is 4. GCS verbal subscore is 5. GCS motor subscore is 6.   Skin: Skin is warm, dry, not diaphoretic and no rash. Capillary refill takes 2 to 3 seconds.   Psychiatric: He experiences Normal attention and Normal perception. His speech is normal and behavior is normal. Mood, judgment and thought content normal.   Nursing note and vitals reviewed.      Assessment:     1. Recurrent pansinusitis    2. Sore throat        Plan:       Recurrent pansinusitis  -     amoxicillin-clavulanate 875-125mg (AUGMENTIN) 875-125 mg per tablet; Take 1 tablet by mouth every 12 (twelve) hours. for 7 days  Dispense: 14 tablet; Refill: 0    Sore throat  -     Cancel: POCT rapid strep A  -     POCT Influenza A/B  -     SARS Coronavirus 2 Antigen, POCT Manual Read    Patient reports history of recurrent sinusitis.  He has pansinus tenderness.

## 2025-01-13 ENCOUNTER — CLINICAL SUPPORT (OUTPATIENT)
Dept: REHABILITATION | Facility: HOSPITAL | Age: 60
End: 2025-01-13
Payer: MEDICAID

## 2025-01-13 DIAGNOSIS — M25.611 DECREASED SHOULDER MOBILITY, RIGHT: Primary | ICD-10-CM

## 2025-01-13 PROCEDURE — 97110 THERAPEUTIC EXERCISES: CPT | Mod: PN

## 2025-01-13 NOTE — PROGRESS NOTES
[facility]   Physical Therapy Discharge Summary     Name: Quinton Clayton  [facility] Number: [id number]    Therapy Diagnosis:   Encounter Diagnosis   Name Primary?    Decreased shoulder mobility, right Yes     Physician: Dung Youngblood,*    Visit Date: 1/13/2025    Physician Orders: PT Eval and Treat   Medical Diagnosis from Referral: M75.121 (ICD-10-CM) - Complete tear of right rotator cuff, unspecified whether traumatic   Evaluation Date: 11/20/2024  Authorization Period Expiration: 1/25/2025  Plan of Care Expiration: 1/3/2025  Visit # / Visits authorized: 1/6     Time In: 1100 am  Time Out: 1125 am  Total Appointment Time (timed & untimed codes): 25 minutes     Precautions: Standard    PTA Visit #: 1/5  Subjective     Patient reports: he is doing better. Was sick so he missed the past few sessions.     He was compliant with home exercise program.  Response to previous treatment: felt okay  Functional change: lifting arm better     Pain: 1/10  Location: right shoulder      Objective      Passive Range of Motion:   Shoulder Left Right Eval Right 12/23 Right 1/13   Flexion 175 110; pain 140  degrees 140 degrees   ER at 0 full full full    ER at 90 90 60; pain 60; painful 40; painful   IR 70 50; pain 50; pain 20; painful      Active Range of Motion:   Apley's Scratch Test:  Able on L              Behind Head: unable              Opp Shoulder: able on R              Behind Back: T12 on L; belt line on R     Shoulder Left Right Eval / Right 12/23 / Right 1/13   Flexion 175 75 / 125 / 130    ER at 0 full full                     Upper Extremity/Back Strength  Right UE  EVAL 12/23/2024 1/13/2025 Left UE     ER at 0/90 abd: 4-/5; painful; NT/5 4+/5; no pain  3+/5 painful 4+/5; no pain  3+/5 painful ER at 0/90 abd: 5/5; 4/5   IR at 0/90 abd: 5/5; NT/5   IR at 0/90 abd: 5/5; 4/5   Supraspinatus: 3/5; pain 3/5 painful 3+/5; painful Supraspinatus 4+/5               Serratus Ant:  3-/5 3+/5 3+/5  Serratus Ant:  3-/5   Upper Trap:  3-/5 3+/5 3+/5 Upper Trap: 3-/5     Treatment     Quinton received the treatments listed below:      therapeutic exercises to develop strength, endurance, ROM, flexibility, and posture for 25 minutes including:    Assessment above  UBE 3 min Fwd/3min Bwd   HEP review - see patient insturctions    Patient Education and Home Exercises       Education provided:   - Home Exercise Program   -Pain science     Written Home Exercises Provided: Yes. Exercises were reviewed and Quinton was able to demonstrate them prior to the end of the session.  Quinton demonstrated good  understanding of the education provided. See Electronic Medical Record under Patient Instructions for exercises provided during therapy sessions    Assessment     Quinton has completed 6 sessions of PT and has met all goals. He reports he feels like he can manage shoulder I at this point. HEP reviewed with good understanding. Informed him that with the tears he has, he has good function and that pain/weakness will continue to limit him from lifting anything more than 1#. Pt is adamant that he does not want to have any injection/surgery. Pt advised to not push shoulder into pain with stretching and to avoid lifting anything that causes pain to maintain longevity in his shoulder. Pt d/c from PT.         Goals:   Short Term Goals: 3 weeks  1.Report decreased shoulder pain < / =  6/10  to increase tolerance for ADL's  - met 1/13/2025  2. Increase PROM to 140 degrees  pain free. - met 1/13/2025  3. Pt will improve AROM to 90 degrees pain-free - met 1/13/2025  3. Increased strength by 1/3 MMT grade in shoulder strengthe to increase tolerance for ADL and work activities. - met 1/13/2025  4. Pt to tolerate HEP to improve ROM and independence with ADL's. - met 1/13/2025     Long Term Goals: 6 weeks  1.Report decreased shoulder pain  < / =  3 /10  to increase tolerance for ADL's. - met 1/13/2025  2.Increase AROM to 110 degrees pain free. -  met 1/13/2025  3. Pt goal: return to home activities pain free with activity modification - met 1/13/2025    Plan     D/c    Noel Shafer, PT, DPT

## 2025-03-11 ENCOUNTER — OFFICE VISIT (OUTPATIENT)
Dept: URGENT CARE | Facility: CLINIC | Age: 60
End: 2025-03-11
Payer: MEDICAID

## 2025-03-11 VITALS
SYSTOLIC BLOOD PRESSURE: 150 MMHG | TEMPERATURE: 97 F | DIASTOLIC BLOOD PRESSURE: 87 MMHG | BODY MASS INDEX: 39.08 KG/M2 | WEIGHT: 273 LBS | HEART RATE: 74 BPM | OXYGEN SATURATION: 95 % | RESPIRATION RATE: 18 BRPM | HEIGHT: 70 IN

## 2025-03-11 DIAGNOSIS — Z20.822 COVID-19 VIRUS NOT DETECTED: ICD-10-CM

## 2025-03-11 DIAGNOSIS — R89.4 INFLUENZA A VIRUS NOT DETECTED: ICD-10-CM

## 2025-03-11 DIAGNOSIS — R09.81 SINUS CONGESTION: Primary | ICD-10-CM

## 2025-03-11 LAB
CTP QC/QA: YES
CTP QC/QA: YES
FLUAV AG NPH QL: NEGATIVE
FLUBV AG NPH QL: NEGATIVE
SARS CORONAVIRUS 2 ANTIGEN: NEGATIVE

## 2025-03-11 PROCEDURE — 87804 INFLUENZA ASSAY W/OPTIC: CPT | Mod: QW,,,

## 2025-03-11 PROCEDURE — 87811 SARS-COV-2 COVID19 W/OPTIC: CPT | Mod: QW,S$GLB,,

## 2025-03-11 PROCEDURE — 99214 OFFICE O/P EST MOD 30 MIN: CPT | Mod: S$GLB,,,

## 2025-03-11 RX ORDER — DOXYCYCLINE 100 MG/1
100 CAPSULE ORAL 2 TIMES DAILY
Qty: 14 CAPSULE | Refills: 0 | Status: SHIPPED | OUTPATIENT
Start: 2025-03-11 | End: 2025-03-18

## 2025-03-11 NOTE — PROGRESS NOTES
"Subjective:      Patient ID: Quinton Clayton is a 59 y.o. male.    Vitals:  height is 5' 10" (1.778 m) and weight is 123.8 kg (273 lb). His oral temperature is 97.1 °F (36.2 °C). His blood pressure is 150/87 (abnormal) and his pulse is 74. His respiration is 18 and oxygen saturation is 95%.     Chief Complaint: Sinus Problem    History of recurrent sinusitis, in clinic complaint of severe facial pains, congestion, and productive cough that began yesterday.  Has tried no medications.  Has not vaccinated for influenza or COVID.  Denies history of smoking.  Significant other at home also ill.    Sinus Problem  This is a new problem. The current episode started today. The problem is unchanged. There has been no fever. His pain is at a severity of 4/10. The pain is moderate. Associated symptoms include congestion, coughing, headaches and sinus pressure. Pertinent negatives include no sore throat. Past treatments include nothing.       Constitution: Negative for fever.   HENT:  Positive for congestion, sinus pain and sinus pressure. Negative for sore throat.    Eyes: Negative.    Respiratory:  Positive for cough.    Allergic/Immunologic: Positive for recurrent sinus infections. Negative for flu shot.   Neurological:  Positive for headaches.      Objective:     Physical Exam   Constitutional: He is oriented to person, place, and time. He is cooperative.  Non-toxic appearance. He does not appear ill. No distress.   HENT:   Head: Normocephalic and atraumatic.   Ears:   Right Ear: Tympanic membrane, external ear and ear canal normal.   Left Ear: Tympanic membrane, external ear and ear canal normal.   Nose: Rhinorrhea and congestion present. Right sinus exhibits maxillary sinus tenderness and frontal sinus tenderness. Left sinus exhibits maxillary sinus tenderness and frontal sinus tenderness.   Mouth/Throat: Uvula is midline, oropharynx is clear and moist and mucous membranes are normal. Mucous membranes are moist. No " oropharyngeal exudate or posterior oropharyngeal erythema. Oropharynx is clear.   Eyes: Conjunctivae and lids are normal. Pupils are equal, round, and reactive to light. Extraocular movement intact   Neck: Trachea normal and phonation normal. Neck supple.   Cardiovascular: Normal rate, regular rhythm, normal heart sounds and normal pulses.   Pulmonary/Chest: Effort normal and breath sounds normal. No stridor. He has no wheezes. He has no rhonchi. He has no rales.   Abdominal: Normal appearance.   Musculoskeletal: Normal range of motion.         General: Normal range of motion.   Lymphadenopathy:     He has no cervical adenopathy.   Neurological: no focal deficit. He is alert, oriented to person, place, and time and at baseline. He has normal motor skills, normal sensation and intact cranial nerves (2-12). Gait and coordination normal. GCS eye subscore is 4. GCS verbal subscore is 5. GCS motor subscore is 6.   Skin: Skin is warm, dry and not diaphoretic. Capillary refill takes 2 to 3 seconds.   Psychiatric: He experiences Normal attention and Normal perception. His speech is normal and behavior is normal. Mood, judgment and thought content normal.   Nursing note and vitals reviewed.      Assessment:     1. Sinus congestion    2. Influenza A virus not detected    3. COVID-19 virus not detected        Plan:       Sinus congestion  -     SARS Coronavirus 2 Antigen, POCT Manual Read  -     POCT Influenza A/B Rapid Antigen  -     doxycycline (VIBRAMYCIN) 100 MG Cap; Take 1 capsule (100 mg total) by mouth 2 (two) times daily. for 7 days  Dispense: 14 capsule; Refill: 0    Influenza A virus not detected    COVID-19 virus not detected          Medical Decision Making:   History:   Old Medical Records: I decided to obtain old medical records.  Old Records Summarized: records from clinic visits.  Initial Assessment:   History of recurrent sinusitis.  Pansinus tenderness.  Productive cough.  Afebrile.  Negative influenza COVID  clinic.  Vital signs are stable.  Pox normal.  Lungs CTA.  Differential Diagnosis:   Viral URI, COVID, influenza, hay fever  Clinical Tests:   Lab Tests: Ordered and Reviewed

## 2025-03-27 ENCOUNTER — PATIENT OUTREACH (OUTPATIENT)
Dept: ADMINISTRATIVE | Facility: OTHER | Age: 60
End: 2025-03-27
Payer: MEDICAID

## 2025-03-27 NOTE — PROGRESS NOTES
[facility] - Outreach    Outreach to patient who does not have a PCP on file.  Verified HIPAA with pt. Inquired if pt was interested in establishing care with [facility]   Patient declined. Patient stated he is not in need of PCP at this time

## 2025-04-02 ENCOUNTER — OFFICE VISIT (OUTPATIENT)
Dept: URGENT CARE | Facility: CLINIC | Age: 60
End: 2025-04-02
Payer: MEDICAID

## 2025-04-02 VITALS
DIASTOLIC BLOOD PRESSURE: 93 MMHG | TEMPERATURE: 99 F | HEART RATE: 73 BPM | HEIGHT: 70 IN | BODY MASS INDEX: 39.08 KG/M2 | SYSTOLIC BLOOD PRESSURE: 144 MMHG | WEIGHT: 273 LBS | OXYGEN SATURATION: 96 % | RESPIRATION RATE: 18 BRPM

## 2025-04-02 DIAGNOSIS — I10 HYPERTENSION, UNSPECIFIED TYPE: ICD-10-CM

## 2025-04-02 DIAGNOSIS — K21.9 GASTROESOPHAGEAL REFLUX DISEASE, UNSPECIFIED WHETHER ESOPHAGITIS PRESENT: ICD-10-CM

## 2025-04-02 DIAGNOSIS — Z76.0 ENCOUNTER FOR MEDICATION REFILL: ICD-10-CM

## 2025-04-02 DIAGNOSIS — L25.5 DERMATITIS DUE TO PLANTS, INCLUDING POISON IVY, SUMAC, AND OAK: Primary | ICD-10-CM

## 2025-04-02 PROCEDURE — 99214 OFFICE O/P EST MOD 30 MIN: CPT | Mod: S$GLB,,,

## 2025-04-02 RX ORDER — CETIRIZINE HYDROCHLORIDE 10 MG/1
10 TABLET ORAL 2 TIMES DAILY
Qty: 20 TABLET | Refills: 0 | Status: SHIPPED | OUTPATIENT
Start: 2025-04-02 | End: 2025-04-12

## 2025-04-02 RX ORDER — PANTOPRAZOLE SODIUM 40 MG/1
40 TABLET, DELAYED RELEASE ORAL DAILY
Qty: 30 TABLET | Refills: 0 | Status: SHIPPED | OUTPATIENT
Start: 2025-04-02

## 2025-04-02 RX ORDER — METHYLPREDNISOLONE 4 MG/1
TABLET ORAL
Qty: 21 EACH | Refills: 0 | Status: SHIPPED | OUTPATIENT
Start: 2025-04-02

## 2025-04-02 RX ORDER — FAMOTIDINE 20 MG/1
20 TABLET, FILM COATED ORAL 2 TIMES DAILY
Qty: 20 TABLET | Refills: 0 | Status: SHIPPED | OUTPATIENT
Start: 2025-04-02 | End: 2025-04-12

## 2025-04-02 RX ORDER — AMLODIPINE BESYLATE 10 MG/1
10 TABLET ORAL DAILY
Qty: 30 TABLET | Refills: 0 | Status: SHIPPED | OUTPATIENT
Start: 2025-04-02

## 2025-04-02 NOTE — PATIENT INSTRUCTIONS
Please follow up with primary care for further evaluation.    Take Pepcid and Zyrtec twice a day for the next 10 days.  You can start your Protonix again wants the Pepcid is completed    Steroid pack as prescribed.    You can try oatmeal baths or Ivarest cream over-the-counter for relief of itching and irritation.

## 2025-04-02 NOTE — PROGRESS NOTES
"Subjective:      Patient ID: Quinton Clayton is a 59 y.o. male.    Vitals:  height is 5' 10" (1.778 m) and weight is 123.8 kg (273 lb). His oral temperature is 98.5 °F (36.9 °C). His blood pressure is 144/93 (abnormal) and his pulse is 73. His respiration is 18 and oxygen saturation is 96%.     Chief Complaint: Rash    Patient was outside doing [employer] work and developed a reaction to poison oak.  He has had this reaction in the past.  Does not remember coming into context to plant specifically.  Rashes generalized throughout the body.  No facial or oral swelling.  No difficulty breathing or shortness of breath.    Rash  This is a new problem. Episode onset: x2 days. The problem is unchanged. The affected locations include the abdomen, right arm and left arm. The rash is characterized by itchiness, dryness and redness. He was exposed to plant contact. Pertinent negatives include no fatigue or fever. Past treatments include nothing.       Constitution: Negative for chills, fatigue and fever.   Skin:  Positive for rash (Generalized) and erythema.   Allergic/Immunologic: Positive for itching.      Objective:     Physical Exam   Constitutional: He is oriented to person, place, and time. He appears well-developed.   HENT:   Head: Normocephalic and atraumatic. Head is without abrasion, without contusion and without laceration.   Ears:   Right Ear: External ear normal.   Left Ear: External ear normal.   Nose: Nose normal. No congestion.   Mouth/Throat: Oropharynx is clear and moist and mucous membranes are normal.   Eyes: Conjunctivae, EOM and lids are normal. Pupils are equal, round, and reactive to light.   Neck: Trachea normal and phonation normal. Neck supple.   Cardiovascular: Normal rate.   Pulmonary/Chest: Effort normal. No respiratory distress.   Musculoskeletal: Normal range of motion.         General: Normal range of motion.   Neurological: He is alert and oriented to person, place, and time. He displays no weakness. "   Skin: Skin is warm, dry, intact and rash. Capillary refill takes less than 2 seconds. erythema No abrasion, No burn, No bruising and No ecchymosis   Psychiatric: His speech is normal and behavior is normal. Mood, judgment and thought content normal.   Nursing note and vitals reviewed.      Assessment:     1. Dermatitis due to plants, including poison ivy, sumac, and oak    2. Hypertension, unspecified type    3. Gastroesophageal reflux disease, unspecified whether esophagitis present    4. Encounter for medication refill        Plan:       Dermatitis due to plants, including poison ivy, sumac, and oak  -     methylPREDNISolone (MEDROL DOSEPACK) 4 mg tablet; use as directed  Dispense: 21 each; Refill: 0  -     cetirizine (ZYRTEC) 10 MG tablet; Take 1 tablet (10 mg total) by mouth 2 (two) times a day. for 10 days  Dispense: 20 tablet; Refill: 0  -     famotidine (PEPCID) 20 MG tablet; Take 1 tablet (20 mg total) by mouth 2 (two) times daily. for 10 days  Dispense: 20 tablet; Refill: 0    Hypertension, unspecified type  -     amLODIPine (NORVASC) 10 MG tablet; Take 1 tablet (10 mg total) by mouth once daily.  Dispense: 30 tablet; Refill: 0    Gastroesophageal reflux disease, unspecified whether esophagitis present  -     pantoprazole (PROTONIX) 40 MG tablet; Take 1 tablet (40 mg total) by mouth once daily.  Dispense: 30 tablet; Refill: 0    Encounter for medication refill  -     pantoprazole (PROTONIX) 40 MG tablet; Take 1 tablet (40 mg total) by mouth once daily.  Dispense: 30 tablet; Refill: 0  -     amLODIPine (NORVASC) 10 MG tablet; Take 1 tablet (10 mg total) by mouth once daily.  Dispense: 30 tablet; Refill: 0      Patient declined steroid shot in clinic in favor of oral steroid use.    Patient also requesting refill on Protonix as well as amlodipine.    Discussed medication with patient who acknowledges understanding and is agreeable to POC. Follow up with primary care. Increase fluid intake. Red flags for ER  discussed.

## 2025-06-11 DIAGNOSIS — Z76.0 ENCOUNTER FOR MEDICATION REFILL: ICD-10-CM

## 2025-06-11 DIAGNOSIS — K21.9 GASTROESOPHAGEAL REFLUX DISEASE, UNSPECIFIED WHETHER ESOPHAGITIS PRESENT: ICD-10-CM

## 2025-06-11 RX ORDER — PANTOPRAZOLE SODIUM 40 MG/1
40 TABLET, DELAYED RELEASE ORAL
Qty: 30 TABLET | Refills: 0 | OUTPATIENT
Start: 2025-06-11